# Patient Record
Sex: MALE | Race: WHITE | Employment: OTHER | URBAN - NONMETROPOLITAN AREA
[De-identification: names, ages, dates, MRNs, and addresses within clinical notes are randomized per-mention and may not be internally consistent; named-entity substitution may affect disease eponyms.]

---

## 2023-01-01 ENCOUNTER — CLINICAL DOCUMENTATION (OUTPATIENT)
Dept: NUTRITION | Age: 68
End: 2023-01-01

## 2023-10-20 ENCOUNTER — HOSPITAL ENCOUNTER (OUTPATIENT)
Dept: GENERAL RADIOLOGY | Age: 68
Discharge: HOME | End: 2023-10-20

## 2023-10-20 ENCOUNTER — HOSPITAL ENCOUNTER (OUTPATIENT)
Dept: CT IMAGING | Age: 68
Discharge: HOME | End: 2023-10-20
Attending: RADIOLOGY

## 2023-10-20 DIAGNOSIS — Z00.6 EXAMINATION FOR NORMAL COMPARISON FOR CLINICAL RESEARCH: ICD-10-CM

## 2023-10-23 ENCOUNTER — TELEPHONE (OUTPATIENT)
Dept: SURGERY | Age: 68
End: 2023-10-23

## 2023-10-23 NOTE — TELEPHONE ENCOUNTER
Left a message for Jessica Villavicencio, [facility] office regarding referral.  Per Dr. Gosia Cartagena patient needs referred to pulmonary for PFTs, EBUS, and biopsy.

## 2023-10-24 NOTE — TELEPHONE ENCOUNTER
Cindy Mcgregor, CNP office advised that they will get the patient referred to Pulmonary for evaluation.

## 2023-10-27 ENCOUNTER — OFFICE VISIT (OUTPATIENT)
Dept: PULMONOLOGY | Age: 68
End: 2023-10-27

## 2023-10-27 VITALS
WEIGHT: 159.4 LBS | DIASTOLIC BLOOD PRESSURE: 66 MMHG | TEMPERATURE: 97.8 F | HEART RATE: 79 BPM | HEIGHT: 71 IN | BODY MASS INDEX: 22.31 KG/M2 | OXYGEN SATURATION: 97 % | SYSTOLIC BLOOD PRESSURE: 118 MMHG

## 2023-10-27 DIAGNOSIS — J43.2 CENTRILOBULAR EMPHYSEMA (HCC): ICD-10-CM

## 2023-10-27 DIAGNOSIS — R91.8 MASS OF LINGULA OF LUNG: Primary | ICD-10-CM

## 2023-10-27 RX ORDER — HYDROCHLOROTHIAZIDE 25 MG/1
25 TABLET ORAL EVERY MORNING
COMMUNITY
Start: 2023-09-19

## 2023-10-27 RX ORDER — SODIUM CHLORIDE 9 MG/ML
INJECTION, SOLUTION INTRAVENOUS CONTINUOUS
Status: DISCONTINUED | OUTPATIENT
Start: 2023-10-27 | End: 2023-10-30 | Stop reason: HOSPADM

## 2023-10-27 RX ORDER — ATORVASTATIN CALCIUM 10 MG/1
5 TABLET, FILM COATED ORAL DAILY
COMMUNITY
Start: 2023-10-11

## 2023-10-27 RX ORDER — ASPIRIN 325 MG
325 TABLET ORAL DAILY
COMMUNITY

## 2023-10-27 RX ORDER — UMECLIDINIUM BROMIDE AND VILANTEROL TRIFENATATE 62.5; 25 UG/1; UG/1
1 POWDER RESPIRATORY (INHALATION) DAILY
Qty: 60 EACH | Refills: 2 | Status: SHIPPED | OUTPATIENT
Start: 2023-10-27

## 2023-10-27 RX ORDER — ALBUTEROL SULFATE 90 UG/1
2 AEROSOL, METERED RESPIRATORY (INHALATION) 4 TIMES DAILY PRN
Qty: 18 G | Refills: 5 | Status: SHIPPED | OUTPATIENT
Start: 2023-10-27

## 2023-10-27 RX ORDER — METOPROLOL SUCCINATE 25 MG/1
25 TABLET, EXTENDED RELEASE ORAL DAILY
COMMUNITY
Start: 2023-09-19

## 2023-10-27 RX ORDER — LEVOCETIRIZINE DIHYDROCHLORIDE 5 MG/1
5 TABLET, FILM COATED ORAL DAILY
COMMUNITY
Start: 2023-10-13

## 2023-10-30 ENCOUNTER — APPOINTMENT (OUTPATIENT)
Dept: GENERAL RADIOLOGY | Age: 68
End: 2023-10-30
Attending: INTERNAL MEDICINE
Payer: MEDICARE

## 2023-10-30 ENCOUNTER — ANESTHESIA (OUTPATIENT)
Dept: ENDOSCOPY | Age: 68
End: 2023-10-30
Payer: MEDICARE

## 2023-10-30 ENCOUNTER — ANESTHESIA EVENT (OUTPATIENT)
Dept: ENDOSCOPY | Age: 68
End: 2023-10-30
Payer: MEDICARE

## 2023-10-30 ENCOUNTER — HOSPITAL ENCOUNTER (OUTPATIENT)
Age: 68
Setting detail: OUTPATIENT SURGERY
Discharge: HOME | End: 2023-10-30
Attending: INTERNAL MEDICINE | Admitting: INTERNAL MEDICINE
Payer: MEDICARE

## 2023-10-30 VITALS
DIASTOLIC BLOOD PRESSURE: 62 MMHG | WEIGHT: 150 LBS | SYSTOLIC BLOOD PRESSURE: 129 MMHG | OXYGEN SATURATION: 93 % | HEIGHT: 71 IN | HEART RATE: 85 BPM | RESPIRATION RATE: 16 BRPM | TEMPERATURE: 97.3 F | BODY MASS INDEX: 21 KG/M2

## 2023-10-30 PROCEDURE — 6360000002 HC RX W HCPCS

## 2023-10-30 PROCEDURE — 3609020000 HC BRONCHOSCOPY W/EBUS FNA: Performed by: INTERNAL MEDICINE

## 2023-10-30 PROCEDURE — 2580000003 HC RX 258: Performed by: INTERNAL MEDICINE

## 2023-10-30 PROCEDURE — 88172 CYTP DX EVAL FNA 1ST EA SITE: CPT

## 2023-10-30 PROCEDURE — 87205 SMEAR GRAM STAIN: CPT

## 2023-10-30 PROCEDURE — 88173 CYTOPATH EVAL FNA REPORT: CPT

## 2023-10-30 PROCEDURE — 2500000003 HC RX 250 WO HCPCS

## 2023-10-30 PROCEDURE — 31624 DX BRONCHOSCOPE/LAVAGE: CPT | Performed by: INTERNAL MEDICINE

## 2023-10-30 PROCEDURE — 7100000011 HC PHASE II RECOVERY - ADDTL 15 MIN: Performed by: INTERNAL MEDICINE

## 2023-10-30 PROCEDURE — 88305 TISSUE EXAM BY PATHOLOGIST: CPT

## 2023-10-30 PROCEDURE — 3700000001 HC ADD 15 MINUTES (ANESTHESIA): Performed by: INTERNAL MEDICINE

## 2023-10-30 PROCEDURE — 31628 BRONCHOSCOPY/LUNG BX EACH: CPT | Performed by: INTERNAL MEDICINE

## 2023-10-30 PROCEDURE — 88341 IMHCHEM/IMCYTCHM EA ADD ANTB: CPT

## 2023-10-30 PROCEDURE — 7100000010 HC PHASE II RECOVERY - FIRST 15 MIN: Performed by: INTERNAL MEDICINE

## 2023-10-30 PROCEDURE — 2500000003 HC RX 250 WO HCPCS: Performed by: NURSE ANESTHETIST, CERTIFIED REGISTERED

## 2023-10-30 PROCEDURE — 3609011100 HC BRONCHOSCOPY BRUSHINGS: Performed by: INTERNAL MEDICINE

## 2023-10-30 PROCEDURE — 7100000001 HC PACU RECOVERY - ADDTL 15 MIN: Performed by: INTERNAL MEDICINE

## 2023-10-30 PROCEDURE — 87070 CULTURE OTHR SPECIMN AEROBIC: CPT

## 2023-10-30 PROCEDURE — 2720000010 HC SURG SUPPLY STERILE: Performed by: INTERNAL MEDICINE

## 2023-10-30 PROCEDURE — 3609010800 HC BRONCHOSCOPY ALVEOLAR LAVAGE: Performed by: INTERNAL MEDICINE

## 2023-10-30 PROCEDURE — 88342 IMHCHEM/IMCYTCHM 1ST ANTB: CPT

## 2023-10-30 PROCEDURE — 3700000000 HC ANESTHESIA ATTENDED CARE: Performed by: INTERNAL MEDICINE

## 2023-10-30 PROCEDURE — 71046 X-RAY EXAM CHEST 2 VIEWS: CPT

## 2023-10-30 PROCEDURE — 89051 BODY FLUID CELL COUNT: CPT

## 2023-10-30 PROCEDURE — 88104 CYTOPATH FL NONGYN SMEARS: CPT

## 2023-10-30 PROCEDURE — 31652 BRONCH EBUS SAMPLNG 1/2 NODE: CPT | Performed by: INTERNAL MEDICINE

## 2023-10-30 PROCEDURE — 88177 CYTP FNA EVAL EA ADDL: CPT

## 2023-10-30 PROCEDURE — 7100000000 HC PACU RECOVERY - FIRST 15 MIN: Performed by: INTERNAL MEDICINE

## 2023-10-30 RX ORDER — DEXAMETHASONE SODIUM PHOSPHATE 4 MG/ML
INJECTION, SOLUTION INTRA-ARTICULAR; INTRALESIONAL; INTRAMUSCULAR; INTRAVENOUS; SOFT TISSUE PRN
Status: DISCONTINUED | OUTPATIENT
Start: 2023-10-30 | End: 2023-10-30 | Stop reason: SDUPTHER

## 2023-10-30 RX ORDER — LIDOCAINE HYDROCHLORIDE 20 MG/ML
INJECTION, SOLUTION EPIDURAL; INFILTRATION; INTRACAUDAL; PERINEURAL PRN
Status: DISCONTINUED | OUTPATIENT
Start: 2023-10-30 | End: 2023-10-30 | Stop reason: SDUPTHER

## 2023-10-30 RX ORDER — ONDANSETRON 2 MG/ML
INJECTION INTRAMUSCULAR; INTRAVENOUS PRN
Status: DISCONTINUED | OUTPATIENT
Start: 2023-10-30 | End: 2023-10-30 | Stop reason: SDUPTHER

## 2023-10-30 RX ORDER — ROCURONIUM BROMIDE 10 MG/ML
INJECTION, SOLUTION INTRAVENOUS PRN
Status: DISCONTINUED | OUTPATIENT
Start: 2023-10-30 | End: 2023-10-30 | Stop reason: SDUPTHER

## 2023-10-30 RX ORDER — FENTANYL CITRATE 50 UG/ML
INJECTION, SOLUTION INTRAMUSCULAR; INTRAVENOUS PRN
Status: DISCONTINUED | OUTPATIENT
Start: 2023-10-30 | End: 2023-10-30 | Stop reason: SDUPTHER

## 2023-10-30 RX ORDER — PROPOFOL 10 MG/ML
INJECTION, EMULSION INTRAVENOUS PRN
Status: DISCONTINUED | OUTPATIENT
Start: 2023-10-30 | End: 2023-10-30 | Stop reason: SDUPTHER

## 2023-10-30 RX ADMIN — ROCURONIUM BROMIDE 35 MG: 10 INJECTION INTRAVENOUS at 14:20

## 2023-10-30 RX ADMIN — PROPOFOL 130 MG: 10 INJECTION, EMULSION INTRAVENOUS at 14:20

## 2023-10-30 RX ADMIN — FENTANYL CITRATE 25 MCG: 50 INJECTION, SOLUTION INTRAMUSCULAR; INTRAVENOUS at 14:20

## 2023-10-30 RX ADMIN — PHENYLEPHRINE HYDROCHLORIDE 80 MCG: 10 INJECTION INTRAVENOUS at 15:01

## 2023-10-30 RX ADMIN — SODIUM CHLORIDE: 9 INJECTION, SOLUTION INTRAVENOUS at 13:23

## 2023-10-30 RX ADMIN — DEXAMETHASONE SODIUM PHOSPHATE 8 MG: 4 INJECTION, SOLUTION INTRAMUSCULAR; INTRAVENOUS at 14:23

## 2023-10-30 RX ADMIN — LIDOCAINE HYDROCHLORIDE 40 MG: 20 INJECTION, SOLUTION EPIDURAL; INFILTRATION; INTRACAUDAL; PERINEURAL at 14:20

## 2023-10-30 RX ADMIN — PHENYLEPHRINE HYDROCHLORIDE 80 MCG: 10 INJECTION INTRAVENOUS at 14:41

## 2023-10-30 RX ADMIN — ONDANSETRON 4 MG: 2 INJECTION INTRAMUSCULAR; INTRAVENOUS at 14:32

## 2023-10-30 RX ADMIN — ROCURONIUM BROMIDE 8 MG: 10 INJECTION INTRAVENOUS at 14:50

## 2023-10-30 RX ADMIN — SUGAMMADEX 200 MG: 100 INJECTION, SOLUTION INTRAVENOUS at 16:08

## 2023-10-30 RX ADMIN — PHENYLEPHRINE HYDROCHLORIDE 100 MCG: 10 INJECTION INTRAVENOUS at 14:52

## 2023-10-30 ASSESSMENT — PAIN SCALES - GENERAL
PAINLEVEL_OUTOF10: 0

## 2023-10-30 ASSESSMENT — PAIN - FUNCTIONAL ASSESSMENT: PAIN_FUNCTIONAL_ASSESSMENT: NONE - DENIES PAIN

## 2023-10-30 ASSESSMENT — ENCOUNTER SYMPTOMS: SHORTNESS OF BREATH: 1

## 2023-10-30 NOTE — PROGRESS NOTES
1618 Pt to PACU, alert and oriented. Resp easy and unlabored on room air. Nasal cannula applied upon arrival due to oxygenation. 1620 Increased oxygen to 6L. Pt alert and denies pain. Portable x ray called. VSS.    1628 Portable x ray at bedside, stated that the order for x ray is a 2 view and patient will need to go downstairs for x ray. [address] called to update on plan of care for patient. Pt resting in bed with eyes closed, resp easy and unlabored. VSS.     1645  Pt continues to rest in bed, alert and oriented. Resp easy and unlabored on 2 L NC.    1648 Pt meets criteria for discharge from PACU at this time. Radiology called to make them aware of pt coming to x ray. [facility] called and report given to [facility]. Pt to x ray in stable condition.

## 2023-10-30 NOTE — PROGRESS NOTES
Patient is in phase 2    taking fluids.  discussed findings, plan of care, discharge instructions with .

## 2023-10-30 NOTE — PROGRESS NOTES
EBUS completed, tolerated well. Photos taken. FNA to 4R lymph node X 5, 4L lymph node x4. 2 specimen jars and slides taken to lab per pathologist. Bronchoscopy done- BAL obtained. 1 biopsy obtained. 1 cytology brushing completed. 3 specimen jar labeled and sent to lab. Scope  and  used.

## 2023-10-30 NOTE — PROCEDURES
Bronchoscopy Procedure Note    Date of Operation: 10/30/2023    Pre-op Diagnosis: ROBERTO lung mass with mediastinal LAD     Post-op Diagnosis: same    Surgeon: Lisa Novak DO    Anesthesia: General endotracheal anesthesia    Operation: Flexible fiberoptic bronchoscopy, EBUS, BAL     Estimated Blood Loss: less than 50     Complications: none    Indications and History:  The patient is a 77 y.o. male with ROBERTO lung mass with mediastinal LAD . The risks, benefits, complications, treatment options and expected outcomes were discussed with the patient. The possibilities of reaction to medication, pulmonary aspiration, perforation of a viscus, bleeding, failure to diagnose a condition and creating a complication requiring transfusion or operation were discussed with the patient who freely signed the consent. Description of Procedure: The patient was taken to endoscopy suite, identified as Brigida White and the procedure verified as Flexible Fiberoptic Bronchoscopy. A Time Out was held and the above information confirmed. After induction and oralpharyngeal intubation, the patient was placed in appropriate position and the bronchoscope was passed through the ETT. The scope was then passed into the trachea. Lidocaine 2% 3 ml was used topically on the dilia. Careful inspection of the tracheal lumen was accomplished. The scope was sequentially passed into the left main and then left upper and lower bronchi and segmental bronchi. The scope was then withdrawn and advanced into the right main bronchus and then into the RUL, RML, and RLL bronchi and segmental bronchi. The bronchoscope was withdrawn and exchanged for EBUS scope. The EBUS was advanced to station 4R and the lymph node was visualized with real-time ultrasound. Five passes were performed. The site was then changed to 4L and 4 passes were performed.  Once adequate specimens were obtained the airways were again checked to ensure adequate hemostasis and the EBUS scope was withdrawn. Following this the bronchoscope was passed to the ROBERTO anterior segment and transbronchial biopsies were performed x5 under fluoroscopy guidance. Afterwards a cold saline BAL was performed. Once hemostasis was achieved the scope was then withdrawn and procedure was concluded. Endobronchial findings:   Trachea: Normal mucosa  Dacia: Normal mucosa  Right main bronchus: Normal mucosa  Right upper lobe bronchus: Normal mucosa  Right Middle lobe bronchus: Normal mucosa  Right Lower lobe bronchus: Normal mucosa  Left main bronchus: Normal mucosa  Left upper lobe bronchus: Endobronchial lesion partially occluding lumen of ROBERTO anterior segment  Left lower lobe bronchus: Normal mucosa    The Patient was taken to the Endoscopy Recovery area in satisfactory condition. A CXR was ordered to ensure no pneumothorax. Recommendation:  1. F/U on culture results  2.  F/U on cytology results      Luz Rankin DO

## 2023-10-30 NOTE — PROGRESS NOTES
[facility]    Patient: Ge Lipoma, 77 y.o.   : 1955  10/27/2023    Patient of KILLIAN Cody CNP   Referring Provider: No ref. provider found       Subjective     No chief complaint on file. KAVON Wang with presents to Pulmonology clinic today to discuss abnormal CT chest demonstrating a left upper lobe lung mass with enlarged mediastinal lymphadenopathy. He is accompanied by his wife today. Patient endorses 30 pound unintentional weight loss since August as well as chills, occasional drenching night sweats and feeling exhausted. He also endorses shortness of breath with exertion which is gotten worse. Endorses occasional mucus production but usually the cough is nonproductive. He is a prior pack-a-day smoker but quit in .    10/30/23: Presents today for scheduled bronchoscopy. Immunizations:  Immunization History   Administered Date(s) Administered    COVID-19, PFIZER Bivalent, DO NOT Dilute, (age 12y+), IM, 30 mcg/0.3 mL 10/06/2022    COVID-19, PFIZER GRAY top, DO NOT Dilute, (age 15 y+), IM, 30 mcg/0.3 mL 2022    COVID-19, PFIZER PURPLE top, DILUTE for use, (age 15 y+), 30mcg/0.3mL 2021, 2021, 2021      Past Medical hx   PMH:No past medical history on file. SURGICAL HISTORY:No past surgical history on file. SOCIAL HISTORY:  Social History     Tobacco Use    Smoking status: Former     Packs/day: 1     Types: Cigarettes     Quit date:      Years since quittin.8    Smokeless tobacco: Never    Tobacco comments:     Quit smoking    Vaping Use    Vaping Use: Never used   Substance Use Topics    Alcohol use: Yes     Comment: occasionally    Drug use: Never     ALLERGIES:No Known Allergies  FAMILY HISTORY:No family history on file.   CURRENT MEDICATIONS:  Current Facility-Administered Medications   Medication Dose Route Frequency Provider Last Rate Last Admin    0.9 % sodium chloride infusion

## 2023-10-31 LAB
BAL CHARACTER: ABNORMAL
BAL COLLECTION SITE: ABNORMAL
BAL COLOR: ABNORMAL
CILIATED/EPITHELIAL CELLS BAL: 14 % (ref 0–5)
EOSINOPHIL NFR BRONCH MANUAL: 1 % (ref 0–1)
LYMPHOCYTES NFR BRONCH MANUAL: 37 % (ref 10–15)
MACROPHAGE/MONOCYTE BAL: 34 % (ref 86–100)
NEUTROPHILS NFR BRONCH MANUAL: 14 % (ref 0–3)
PATHOLOGIST REVIEW: ABNORMAL
RBC BAL: 6210 /CUMM
TOTAL NUCLEATED CELLS BAL: 115 /CUMM
TOTAL VOLUME RECEIVED BAL: 40 ML

## 2023-10-31 PROCEDURE — 88341 IMHCHEM/IMCYTCHM EA ADD ANTB: CPT

## 2023-10-31 RX ORDER — SODIUM CHLORIDE 0.9 % (FLUSH) 0.9 %
5-40 SYRINGE (ML) INJECTION EVERY 12 HOURS SCHEDULED
Status: ACTIVE | OUTPATIENT
Start: 2023-10-31

## 2023-10-31 RX ORDER — SODIUM CHLORIDE 9 MG/ML
INJECTION, SOLUTION INTRAVENOUS PRN
Status: ACTIVE | OUTPATIENT
Start: 2023-10-31

## 2023-10-31 RX ORDER — SODIUM CHLORIDE 0.9 % (FLUSH) 0.9 %
5-40 SYRINGE (ML) INJECTION PRN
Status: ACTIVE | OUTPATIENT
Start: 2023-10-31

## 2023-10-31 NOTE — ANESTHESIA POSTPROCEDURE EVALUATION
Department of Anesthesiology  Postprocedure Note    Patient: Michelle Diamond  MRN: [id number]  YOB: 1955  Date of evaluation: 10/31/2023      Procedure Summary     Date: 10/30/23 Room / Location: [address] [place] [address] / [address] [place] [address]    Anesthesia Start: 2163 Anesthesia Stop: 8236    Procedures:       BRONCHOSCOPY W/EBUS FNA      BRONCHOSCOPY ALVEOLAR LAVAGE      BRONCHOSCOPY BRUSHINGS Diagnosis:       Lung mass      (Lung mass [R91.8])    Surgeons: Kaila Laughlin DO Responsible Provider: Danielle Bryan DO    Anesthesia Type: general ASA Status: 3          Anesthesia Type: No value filed.     Isha Phase I: Isha Score: 9    Isha Phase II: Isha Score: 10      Anesthesia Post Evaluation    Patient location during evaluation: PACU  Patient participation: complete - patient participated  Level of consciousness: awake  Airway patency: patent  Nausea & Vomiting: no nausea  Complications: no  Cardiovascular status: hemodynamically stable  Respiratory status: acceptable  Hydration status: stable  Pain management: adequate

## 2023-11-01 ENCOUNTER — HOSPITAL ENCOUNTER (OUTPATIENT)
Dept: PET IMAGING | Age: 68
Discharge: HOME | End: 2023-11-01
Attending: INTERNAL MEDICINE
Payer: MEDICARE

## 2023-11-01 ENCOUNTER — TELEPHONE (OUTPATIENT)
Dept: PULMONOLOGY | Age: 68
End: 2023-11-01

## 2023-11-01 DIAGNOSIS — R91.8 MASS OF LINGULA OF LUNG: ICD-10-CM

## 2023-11-01 DIAGNOSIS — C79.51 MALIGNANT NEOPLASM METASTATIC TO BONE (HCC): Primary | ICD-10-CM

## 2023-11-01 LAB
BACTERIA SPEC RESP CULT: NORMAL
GRAM STN SPEC: NORMAL

## 2023-11-01 PROCEDURE — 3430000000 HC RX DIAGNOSTIC RADIOPHARMACEUTICAL: Performed by: INTERNAL MEDICINE

## 2023-11-01 PROCEDURE — 78815 PET IMAGE W/CT SKULL-THIGH: CPT

## 2023-11-01 PROCEDURE — A9552 F18 FDG: HCPCS | Performed by: INTERNAL MEDICINE

## 2023-11-01 RX ORDER — FLUDEOXYGLUCOSE F 18 200 MCI/ML
13.1 INJECTION, SOLUTION INTRAVENOUS
Status: COMPLETED | OUTPATIENT
Start: 2023-11-01 | End: 2023-11-01

## 2023-11-01 RX ADMIN — FLUDEOXYGLUCOSE F 18 13.1 MILLICURIE: 200 INJECTION, SOLUTION INTRAVENOUS at 13:18

## 2023-11-01 NOTE — TELEPHONE ENCOUNTER
Spoke with  Chana Raulito this evening to update him on the results from the PET CT which demonstrated PET avidity concerning for metastatic cancer (uptake in ROBERTO lung mass, mediastinum, L 4th rib, and head of pancreas). Cytology results are pending. Will place order to [address] [place] [address] in lieu of cytology results. This was communicated to  Chana Salmeronby and he is amendable to this.      Ortega Fulton, DO   Pulmonary & Critical Care

## 2023-11-03 PROBLEM — C34.92 MALIGNANT NEOPLASM OF LEFT LUNG (HCC): Status: ACTIVE | Noted: 2023-11-03

## 2023-11-07 ENCOUNTER — OFFICE VISIT (OUTPATIENT)
Dept: PULMONOLOGY | Age: 68
End: 2023-11-07
Payer: MEDICARE

## 2023-11-07 VITALS
SYSTOLIC BLOOD PRESSURE: 138 MMHG | HEIGHT: 71 IN | DIASTOLIC BLOOD PRESSURE: 68 MMHG | WEIGHT: 155.8 LBS | BODY MASS INDEX: 21.81 KG/M2 | TEMPERATURE: 97.7 F | HEART RATE: 91 BPM | OXYGEN SATURATION: 94 %

## 2023-11-07 DIAGNOSIS — J43.8 OTHER EMPHYSEMA (HCC): ICD-10-CM

## 2023-11-07 DIAGNOSIS — R63.4 WEIGHT LOSS, UNINTENTIONAL: ICD-10-CM

## 2023-11-07 DIAGNOSIS — C79.51 MALIGNANT NEOPLASM METASTATIC TO BONE (HCC): ICD-10-CM

## 2023-11-07 DIAGNOSIS — C34.12 ADENOCARCINOMA OF UPPER LOBE OF LEFT LUNG (HCC): Primary | ICD-10-CM

## 2023-11-07 DIAGNOSIS — R53.83 OTHER FATIGUE: ICD-10-CM

## 2023-11-07 PROCEDURE — 1036F TOBACCO NON-USER: CPT | Performed by: NURSE PRACTITIONER

## 2023-11-07 PROCEDURE — 3017F COLORECTAL CA SCREEN DOC REV: CPT | Performed by: NURSE PRACTITIONER

## 2023-11-07 PROCEDURE — G8484 FLU IMMUNIZE NO ADMIN: HCPCS | Performed by: NURSE PRACTITIONER

## 2023-11-07 PROCEDURE — 1123F ACP DISCUSS/DSCN MKR DOCD: CPT | Performed by: NURSE PRACTITIONER

## 2023-11-07 PROCEDURE — G8420 CALC BMI NORM PARAMETERS: HCPCS | Performed by: NURSE PRACTITIONER

## 2023-11-07 PROCEDURE — G8427 DOCREV CUR MEDS BY ELIG CLIN: HCPCS | Performed by: NURSE PRACTITIONER

## 2023-11-07 PROCEDURE — 3023F SPIROM DOC REV: CPT | Performed by: NURSE PRACTITIONER

## 2023-11-07 PROCEDURE — 99213 OFFICE O/P EST LOW 20 MIN: CPT | Performed by: NURSE PRACTITIONER

## 2023-11-07 ASSESSMENT — ENCOUNTER SYMPTOMS
VOMITING: 0
ALLERGIC/IMMUNOLOGIC NEGATIVE: 1
CHEST TIGHTNESS: 0
SHORTNESS OF BREATH: 1
COUGH: 1
GASTROINTESTINAL NEGATIVE: 1
EYES NEGATIVE: 1
WHEEZING: 0
DIARRHEA: 0
NAUSEA: 0
STRIDOR: 0

## 2023-11-08 ENCOUNTER — HOSPITAL ENCOUNTER (OUTPATIENT)
Dept: RADIATION ONCOLOGY | Age: 68
Discharge: HOME | End: 2023-11-08
Payer: MEDICARE

## 2023-11-08 VITALS
BODY MASS INDEX: 21.7 KG/M2 | DIASTOLIC BLOOD PRESSURE: 63 MMHG | HEIGHT: 71 IN | HEART RATE: 84 BPM | WEIGHT: 155 LBS | SYSTOLIC BLOOD PRESSURE: 138 MMHG | OXYGEN SATURATION: 96 % | RESPIRATION RATE: 18 BRPM | TEMPERATURE: 98.2 F

## 2023-11-08 DIAGNOSIS — C34.12 MALIGNANT NEOPLASM OF UPPER LOBE OF LEFT LUNG (HCC): ICD-10-CM

## 2023-11-08 PROCEDURE — 99205 OFFICE O/P NEW HI 60 MIN: CPT

## 2023-11-08 PROCEDURE — 99202 OFFICE O/P NEW SF 15 MIN: CPT | Performed by: RADIOLOGY

## 2023-11-08 ASSESSMENT — ENCOUNTER SYMPTOMS
VOMITING: 0
CHEST TIGHTNESS: 0
BACK PAIN: 0
SORE THROAT: 0
ABDOMINAL PAIN: 0
NAUSEA: 0
BLOOD IN STOOL: 0
COUGH: 1
APNEA: 0
WHEEZING: 0
SHORTNESS OF BREATH: 1
TROUBLE SWALLOWING: 0

## 2023-11-08 NOTE — PROGRESS NOTES
facility-administered medications for this encounter. Facility-Administered Medications Ordered in Other Encounters   Medication Dose Route Frequency Provider Last Rate Last Admin    sodium chloride flush 0.9 % injection 5-40 mL  5-40 mL IntraVENous 2 times per day Wava Shelling P, DO        sodium chloride flush 0.9 % injection 5-40 mL  5-40 mL IntraVENous PRN Wava Shelling P, DO        0.9 % sodium chloride infusion   IntraVENous PRN Sonu Lords, DO           No outpatient medications have been marked as taking for the 11/8/23 encounter [facility] Encounter) with Nikolai Garcia PA-C.       LABORATORY STUDIES:   Onc labs: No results found for: \"PSA\", \"CEA\", \"LDH\", \"AFP\"    No results found for: \"CREATININE\"  No results found for: \"BUN\"    PATHOLOGY: As per HPI above. RADIOLOGIC STUDIES: As per HPI above. ATTESTATION: 60 minutes (09916) minutes were spent with the patient at today's visit. reviewing pertinent information related to their oncologic diagnosis, including any recent labs, imaging, follow ups and plan of care going forward. >50% of time spent in counseling and coordinating care.     CC:Dr. Kati Angelo ([address] [place] [address]) Dr. Scottie Manzano (Pulmonology)   ACC:. [facility]

## 2023-11-08 NOTE — PROGRESS NOTES
P, DO           Past Medical History:  Past Medical History:   Diagnosis Date    Hypertension     Lung cancer ([address]) 10/30/2023       Past Surgical History:  Past Surgical History:   Procedure Laterality Date    BRONCHOSCOPY N/A 10/30/2023    BRONCHOSCOPY W/EBUS FNA performed by Peyton Michel DO at [facility]  10/30/2023    BRONCHOSCOPY ALVEOLAR LAVAGE performed by Peyton Michel DO at [facility]  10/30/2023    BRONCHOSCOPY BRUSHINGS performed by Peyton Michel DO at [address] [facility] [address]  10/30/2023    [facility] ENDOBRONCHIAL ULTRASOUND EBUS  10/30/2023      Fam HX:   Family History   Problem Relation Age of Onset    Stroke Mother     Heart Disease Father     Cancer Brother       Social HX:   Social History     Socioeconomic History    Marital status:      Spouse name: Octavio Christianson    Number of children: Not on file    Years of education: Not on file    Highest education level: Not on file   Occupational History    Not on file   Tobacco Use    Smoking status: Former     Packs/day: 1.00     Years: 32.00     Additional pack years: 0.00     Total pack years: 32.00     Types: Cigarettes     Start date: 0     Quit date: 2004     Years since quittin.8    Smokeless tobacco: Never    Tobacco comments:     Quit smoking 2004   Vaping Use    Vaping Use: Never used   Substance and Sexual Activity    Alcohol use: Yes     Comment: occasionally    Drug use: Never    Sexual activity: Not on file   Other Topics Concern    Not on file   Social History Narrative    Not on file     Social Determinants of Health     Financial Resource Strain: Not on file   Food Insecurity: Not on file   Transportation Needs: Not on file   Physical Activity: Not on file   Stress: Not on file   Social Connections: Not on file   Intimate Partner Violence: Not on file   Housing Stability: Not on file          EXAM:   height is 1.803 m (5' 11\") and weight is 69.4 kg (153 lb).  His oral

## 2023-11-08 NOTE — PROGRESS NOTES
Nutrition Assessment    Reason for Visit:   11/8/23 - new consult for lung cancer and significant weight loss    Nutrition Recommendations:   PO at best efforts  Small and frequent meals and snacks  Trial ONS  High calorie, high protein foods  High calorie, high protein smoothies    Malnutrition Assessment: (11/8/23)  Malnutrition Status: severe malnutrition  Context: acute illness  Findings of the 6 clinical characteristics of malnutrition (minimum of 2 out of 6 clinical characteristics is required to make the dx of moderate or severe Protein Calorie Malnutrition based on AND/ASPEN Guidelines):   1. Energy Intake: <50% of normal intake   2. Weight Loss: 7% over the last month   3. Fat Loss: severe loss   4. Muscle Loss: severe loss   5. Fluid Accumulation: none noted   6.  Strength: not measured    Nutrition Diagnosis:   Problem: severe malnutrition in the context of acute illness  Etiology: catabolic illness, inadequate oral intake  Signs and Symptoms: decreased energy intake, significant unintentional weight loss, severe fat and muscle loss    Nutrition Assessment:   History: lung cancer  Subjective: Patient seen with wife. Wife is very concerned with patient weight loss. Patient admits to being hungry but is just not able to eat much. Wife feels that patient is eating less the 50% of his normal. Patient says that he just fills up quickly or food just doesn't sound good. Patient does report some issues with constipation and uses Milk of Mag to aid with bowel movement. Patient has not tried ONS but is willing. Patient says that he is drinking ~2 bottles of water per day (~32oz).  Current Nutrition:   Oral Diet: general   Oral Diet Intake:  poor    Oral Nutrition Supplement (ONS): ensure clear, boost plus, ensure complete samples provided   ONS intake:    trial  Anthropometric Measures:   Ht:   5'11:   Current Weight: 149# (per patient on 11/8/23)   Usual Weight:   160# (per patient one month ago)   Ideal

## 2023-11-09 ENCOUNTER — SOCIAL WORK (OUTPATIENT)
Dept: RADIATION ONCOLOGY | Age: 68
End: 2023-11-09

## 2023-11-09 NOTE — PROGRESS NOTES
Oncology Social Work    Date: 11/9/2023  Time: 4:17 PM  Name: Bryson Mccormick  MRN: [id number]     Contact Type: Follow-up    Note:   Situation: This staff called Bryson Mccormick via phone support to introduce myself as his Oncology Social Worker. Background:  Roma Kendall had completed a Distress Thermometer at his consultation appointment in the Radiation Dept of the [address] [place] [address]. This staff was calling to review the results. Assessment: Droian's Distress Thermometer reflected a couple concerns which were reviewed during our conversation.   - He explained that he was just beginning his treatment process. He didn't have any questions at this time but was thankful for the call. - Education regarding the services provided by the SW were also discussed. I explained how to locate me in the facility and offered further assistance should something come up regarding her concerns. - Since one of his concerns was fatigue and his changes in eating, it was suggested that he utilize our dietician. He shared his plan to investigate the suggestions and if anything else comes up, he knows how to find me now. No additional referrals were made at this time. Recommendation: Follow-up will be initiated by Roma Kendall based on need.  provided him with my contact information and will remain available for support.         Karyle Hampshire, MSW, LSW, SELMA  Oncology Social Worker      Electronically signed by Karyle Hampshire, MSW, LSW, ACHP-SW on 11/9/2023 at 4:17 PM

## 2023-11-10 ENCOUNTER — OFFICE VISIT (OUTPATIENT)
Dept: ONCOLOGY | Age: 68
End: 2023-11-10
Payer: MEDICARE

## 2023-11-10 ENCOUNTER — CLINICAL DOCUMENTATION (OUTPATIENT)
Dept: CASE MANAGEMENT | Age: 68
End: 2023-11-10

## 2023-11-10 ENCOUNTER — HOSPITAL ENCOUNTER (OUTPATIENT)
Dept: INFUSION THERAPY | Age: 68
Discharge: HOME | End: 2023-11-10
Payer: MEDICARE

## 2023-11-10 VITALS
TEMPERATURE: 97.6 F | BODY MASS INDEX: 21.42 KG/M2 | SYSTOLIC BLOOD PRESSURE: 136 MMHG | DIASTOLIC BLOOD PRESSURE: 62 MMHG | RESPIRATION RATE: 20 BRPM | OXYGEN SATURATION: 95 % | HEIGHT: 71 IN | HEART RATE: 99 BPM | WEIGHT: 153 LBS

## 2023-11-10 VITALS
RESPIRATION RATE: 20 BRPM | HEIGHT: 71 IN | DIASTOLIC BLOOD PRESSURE: 62 MMHG | OXYGEN SATURATION: 95 % | WEIGHT: 153 LBS | BODY MASS INDEX: 21.42 KG/M2 | TEMPERATURE: 97.6 F | HEART RATE: 99 BPM | SYSTOLIC BLOOD PRESSURE: 136 MMHG

## 2023-11-10 DIAGNOSIS — C34.12 MALIGNANT NEOPLASM OF UPPER LOBE OF LEFT LUNG (HCC): Primary | ICD-10-CM

## 2023-11-10 PROCEDURE — G8484 FLU IMMUNIZE NO ADMIN: HCPCS | Performed by: INTERNAL MEDICINE

## 2023-11-10 PROCEDURE — 3017F COLORECTAL CA SCREEN DOC REV: CPT | Performed by: INTERNAL MEDICINE

## 2023-11-10 PROCEDURE — 1036F TOBACCO NON-USER: CPT | Performed by: INTERNAL MEDICINE

## 2023-11-10 PROCEDURE — 1123F ACP DISCUSS/DSCN MKR DOCD: CPT | Performed by: INTERNAL MEDICINE

## 2023-11-10 PROCEDURE — G8420 CALC BMI NORM PARAMETERS: HCPCS | Performed by: INTERNAL MEDICINE

## 2023-11-10 PROCEDURE — 99211 OFF/OP EST MAY X REQ PHY/QHP: CPT

## 2023-11-10 PROCEDURE — 99205 OFFICE O/P NEW HI 60 MIN: CPT | Performed by: INTERNAL MEDICINE

## 2023-11-10 PROCEDURE — G8427 DOCREV CUR MEDS BY ELIG CLIN: HCPCS | Performed by: INTERNAL MEDICINE

## 2023-11-10 NOTE — PROGRESS NOTES
Infectious Disease Clinic Visit    Reason:    Hosp f/u    HPI:    56-year-old man with CAD, HTN, DM2, diabetic foot infections, osteomyelitis of the left foot s/p left hallux amputation and partial left 2nd toe amputation (2020) and right 5th ray amputation (06/09/22), and recently admitted for Right third toe infection/osteomyelitis, with plantar diabetic ulcer wound at 2nd met head which probes to bone per podiatry consult inpatient, thus clinically concerning for possible osteo (MRI did not show OM involving affected 2nd met head).     Outpatient podiatry bone culture of right third toe 11/15 grew MSSA and Pasturella. Podiatry took patient to the OR on (11/25) for right third toe amputation. Surgical cx+ Diptheroids, Gram stain with GPCs. Right 3rd toe amputation performed with 2-sections of amputated toe sent for pathology; remains uncertain if biopsy of bone for clean margin collected / sent.     Pt continued outpt abx tx w/ IV-Ceftriaxone 2 g q 24 hours for pasturella & MSSA (11/15), and Diphtheroids (11/25; GPCs on gram stain) -- to complete 6-weeks (s/p 11/25, surg date of right 3rd toe amputation) -- favored 6wks due to inability to confirm clean  margins, and due to clinical concerns for possible OM involving retained / non-excised 2nd MTPJ given associated plantar wound probe to bone.    --TODAY, in ID clinic patient reports doing well with IV abx, completed last dose of IV-ceftriaxone this AM. Pt following w/ POD, who reports pt progressing well w/o issues.  Pt w/o complaint or issues. Plantar wound healing well, no redness, swelling, pain, warmth, or drainage. Denies fever, chills, night sweats, rash, joint pain/swelling or wounds elsewhere. Denies diarrhea, N/V, constipation, SOB, cough, chest pain, abd pain, difficulty urinating, or headache.    Last labs: 01/04/23: remains w/ normal CRP and no leukocytosis. Cr (0.9, baseline, unchanged).    Review of Systems   Constitutional:  Negative for chills,  Name: Monik Gustafson  : 1955  MRN: [id number]    Oncology Navigation- Initial Note:    Intake-  Contact Type: Medical Oncology  Spouse Teddy Fernandes accompanied consultation     Diagnosis: Thoracic- malignant    Home Disposition: Lives with other who is able to assist  - spouse Teddy Fernandes available to assist if need  -independent/perform ADL/drives  - 3 sons ( combined)  live OOT  - SOB/PARRA , occ cough,increase fatigue,30 ib weight loss since August  -met with Lino lou ( during rad consult)    ONCPOC:  - discussed stage IV disease/ however have mixed diagnosis/ to discuss with pathology  -foundation testing on tissue  -waiting MRI results   - discuss xgeva/zometa-- instructed to get dental clearance  -return Md apt     Radiation informed unable to do at this time on lungs due to extensive disease.          Patient needs and barriers to care: Coordination of Care, Knowledge deficit, and Symptom Management     Referral Source: Outpatient    Receptive to Advanced Care Planning/ Palliative Care:  deferred    Interventions-   General Interventions: Jose program explained;  welcome folder reviewed, including contact information      Education/Screenings:  yes - reiterated ONC POC        Biopsy site status: MD discuss with pathology/ diagnosis not conclusive       Continuum of Care: Diagnosis/Active Treatment    Notes: Jose following to assist & support     Electronically signed by Toro Stephens RN on 11/10/2023 at 11:50 AM diaphoresis, fever and weight loss.   HENT:  Negative for congestion, sinus pain and sore throat.    Eyes:  Negative for pain and discharge.   Respiratory:  Negative for cough, sputum production and shortness of breath.    Cardiovascular:  Negative for chest pain and leg swelling.   Gastrointestinal:  Negative for abdominal pain, diarrhea, nausea and vomiting.   Genitourinary:  Negative for dysuria and hematuria.   Musculoskeletal:  Negative for joint pain.   Skin:  Negative for rash.        Plantar wound at right foot met pad, healing well, no drainage, redness, or swelling.    Neurological:  Negative for focal weakness and headaches.   Endo/Heme/Allergies:  Negative for environmental allergies.   Psychiatric/Behavioral:  Negative for substance abuse. The patient is not nervous/anxious.        Physical Exam  Vitals reviewed.   Constitutional:       General: He is not in acute distress.     Appearance: Normal appearance. He is not ill-appearing, toxic-appearing or diaphoretic.   HENT:      Head: Normocephalic and atraumatic.      Mouth/Throat:      Mouth: Mucous membranes are moist.      Pharynx: Oropharynx is clear.   Eyes:      General: No scleral icterus.     Conjunctiva/sclera: Conjunctivae normal.   Cardiovascular:      Rate and Rhythm: Normal rate and regular rhythm.      Pulses: Normal pulses.      Heart sounds: Normal heart sounds.   Pulmonary:      Effort: Pulmonary effort is normal.      Breath sounds: Normal breath sounds.   Abdominal:      General: Abdomen is flat. Bowel sounds are normal.      Palpations: Abdomen is soft.      Tenderness: There is no abdominal tenderness.   Musculoskeletal:         General: No swelling or tenderness. Normal range of motion.      Cervical back: Normal range of motion. No tenderness.      Right lower leg: No edema.      Left lower leg: No edema.   Lymphadenopathy:      Cervical: No cervical adenopathy.   Skin:     General: Skin is warm and dry.      Coloration: Skin is  not jaundiced.      Findings: No erythema or rash.      Comments: Plantar wound at right foot met pad, healing well, no drainage, redness, or swelling.    Neurological:      Mental Status: He is alert and oriented to person, place, and time. Mental status is at baseline.   Psychiatric:         Behavior: Behavior normal.         Thought Content: Thought content normal.         Review of patient's allergies indicates:   Allergen Reactions    Penicillins Other (See Comments)     PCN allergy as a child - was told he went into a coma. Tolerates Cefazolin without adverse reactions    Shellfish containing products      Other reaction(s): Unknown    Vancomycin Itching     Tolerated vancomycin 7/2020    Bactrim  [sulfamethoxazole-trimethoprim] Rash         Current Outpatient Medications:     acetaminophen (TYLENOL) 500 MG tablet, Take 1,000 mg by mouth daily as needed for Pain., Disp: , Rfl:     ascorbic acid, vitamin C, (VITAMIN C) 250 MG tablet, Take 500 mg by mouth once daily., Disp: , Rfl:     aspirin (ECOTRIN) 81 MG EC tablet, Take 1 tablet (81 mg total) by mouth once daily., Disp: , Rfl: 0    atorvastatin (LIPITOR) 80 MG tablet, Take 1 tablet (80 mg total) by mouth once daily. Take every night., Disp: 90 tablet, Rfl: 3    bismuth subsalicylate (PEPTO BISMOL) 262 mg/15 mL suspension, Take 15 mLs by mouth daily as needed (diarrhea)., Disp: , Rfl:     blood sugar diagnostic (ACCU-CHEK GUIDE TEST STRIPS) Strp, 1 each by Misc.(Non-Drug; Combo Route) route 5 (five) times daily., Disp: 150 each, Rfl: 11    carvediloL (COREG) 12.5 MG tablet, Take 0.5 tablets (6.25 mg total) by mouth 2 (two) times daily with meals., Disp: 180 tablet, Rfl: 3    cefTRIAXone (ROCEPHIN) 2 g/50 mL PgBk IVPB, Inject 50 mLs (2 g total) into the vein Daily., Disp: , Rfl: 0    dapagliflozin (FARXIGA) 5 mg Tab tablet, Take 1 tablet (5 mg total) by mouth once daily., Disp: 30 tablet, Rfl: 4    insulin (LANTUS SOLOSTAR U-100 INSULIN) glargine 100 units/mL  "SubQ pen, Inject 50 Units into the skin once daily. USE AS BACK UP INSULIN ONLY - EMERGENCY USE IF YOUR ARE OFF OF YOUR INSULIN PUMP, Disp: 15 mL, Rfl: 1    insulin aspart U-100 (NOVOLOG U-100 INSULIN ASPART) 100 unit/mL injection, To use continuously with insulin pump. Max TDD of 100 units, Disp: 30 mL, Rfl: 3    insulin lispro (HUMALOG U-100 INSULIN) 100 unit/mL injection, Inject 15 Units into the skin 3 (three) times daily before meals. Gives via insulin pump only. Do not Directly inject., Disp: , Rfl:     lancets (ACCU-CHEK FASTCLIX LANCET DRUM) Misc, 1 each by Misc.(Non-Drug; Combo Route) route Daily., Disp: 30 each, Rfl: 11    lisinopriL (PRINIVIL,ZESTRIL) 40 MG tablet, Take 1 tablet by mouth once daily, Disp: 30 tablet, Rfl: 0    metFORMIN (GLUCOPHAGE) 1000 MG tablet, Take 1 tablet (1,000 mg total) by mouth 2 (two) times daily with meals., Disp: 180 tablet, Rfl: 3    multivit-min/folic/vit K/lycop (MEN'S MULTIVITAMIN ORAL), Take 1 tablet by mouth once daily., Disp: , Rfl:     pen needle, diabetic 31 gauge x 3/16" Ndle, Inject 1 each into the skin 4 (four) times daily., Disp: , Rfl:     MINIMED 770G INSULIN PUMP Misc, 1 each by Misc.(Non-Drug; Combo Route) route continuous. Medically necessary for management of Type 2 diabetes, E11.65, Disp: 1 each, Rfl: 0    Past Medical History:   Diagnosis Date    Allergy     CAD (coronary artery disease), native coronary artery 6/25/2013    Cancer     COVID-19 virus detected 9/1/2020    Diabetes mellitus     Diabetes mellitus, type 2     Disorder of kidney and ureter     Heart attack 04/2012    Hx of colon cancer, stage I     Hyperlipidemia     Hypertension     Muscular pain     post-op after colonoscopy    NSTEMI May 2013 - peak troponin 0.22 5/22/2013    MICHAELA (obstructive sleep apnea)     Retinopathy due to secondary diabetes        Lab Results   Component Value Date    WBC 7.83 01/03/2023    CRP 4.5 01/03/2023    SEDRATE 56 (H) 11/22/2022    CREATININE 0.9 01/03/2023    " "AST 22 01/03/2023    ALT 30 01/03/2023    ALKPHOS 107 01/03/2023       Immunization History   Administered Date(s) Administered    COVID-19, MRNA, LN-S, PF (MODERNA FULL 0.5 ML DOSE) 03/30/2021, 04/28/2021    Influenza 01/18/2019    Influenza - Intradermal - Quadrivalent - PF 10/24/2013    Influenza - Quadrivalent - PF *Preferred* (6 months and older) 11/08/2010, 09/28/2020, 12/07/2022    Pneumococcal Conjugate - 13 Valent 09/28/2020    Pneumococcal Polysaccharide - 23 Valent 06/29/2017    Tdap 09/08/2015    Zoster Recombinant 09/28/2020         Reviewed available labs and imaging.     Vitals:    01/06/23 0942   BP: 127/71   BP Location: Left arm   Pulse: (!) 51   Temp: 98.1 °F (36.7 °C)   TempSrc: Oral   Weight: 125.3 kg (276 lb 3.8 oz)   Height: 6' 2" (1.88 m)         Assessment:  Encounter Diagnoses   Name Primary?    Other acute osteomyelitis of right foot Yes   S/p amputation of 3rd toe (11/25/22) unable to confirm clean margins; completed 6wks IV-ceftriaxone for OM (given c/f of 3rd, but also 2nd toe/met head w/ assoc plantar wound), end-date 01/06/23 (today), completed last dose this AM.   -- pt improved, no concerns for active infection.       Plan:     Remove PICC line w/ infusion suite team.  Continue to maintain f/u w/ POD as indicated, and w/ PCP for optimizing health to promote wound healing and prevent recurrence of wounds and potential complications thereof.      Follow-up: PRN      Orders Placed This Encounter   Procedures    PICC line removal    Nursing communication         "

## 2023-11-14 ENCOUNTER — HOSPITAL ENCOUNTER (OUTPATIENT)
Dept: MRI IMAGING | Age: 68
Discharge: HOME | End: 2023-11-14
Payer: MEDICARE

## 2023-11-14 DIAGNOSIS — C79.51 MALIGNANT NEOPLASM METASTATIC TO BONE (HCC): ICD-10-CM

## 2023-11-14 DIAGNOSIS — C34.12 ADENOCARCINOMA OF UPPER LOBE OF LEFT LUNG (HCC): ICD-10-CM

## 2023-11-14 LAB — POC CREATININE WHOLE BLOOD: 0.7 MG/DL (ref 0.5–1.2)

## 2023-11-14 PROCEDURE — A9579 GAD-BASE MR CONTRAST NOS,1ML: HCPCS | Performed by: NURSE PRACTITIONER

## 2023-11-14 PROCEDURE — 70553 MRI BRAIN STEM W/O & W/DYE: CPT

## 2023-11-14 PROCEDURE — 6360000004 HC RX CONTRAST MEDICATION: Performed by: NURSE PRACTITIONER

## 2023-11-14 PROCEDURE — 82565 ASSAY OF CREATININE: CPT

## 2023-11-14 RX ADMIN — GADOTERIDOL 15 ML: 279.3 INJECTION, SOLUTION INTRAVENOUS at 18:33

## 2023-11-17 DIAGNOSIS — C34.12 MALIGNANT NEOPLASM OF UPPER LOBE OF LEFT LUNG (HCC): Primary | ICD-10-CM

## 2023-11-29 DIAGNOSIS — C34.12 MALIGNANT NEOPLASM OF UPPER LOBE OF LEFT LUNG (HCC): Primary | ICD-10-CM

## 2023-11-29 DIAGNOSIS — R97.8 OTHER ABNORMAL TUMOR MARKERS: ICD-10-CM

## 2023-11-29 DIAGNOSIS — R97.0 CARCINOEMBRYONIC ANTIGEN (CEA) ELEVATION: ICD-10-CM

## 2023-11-29 NOTE — PROGRESS NOTES
head, likely malignant. 4. Lytic lesion at the left lateral fourth rib highly suspicious for osseous metastatic disease. Final report electronically signed by Dr. Ulysses Carrillo on 11/1/2023 3:25 PM    XR CHEST (2 VW)    Result Date: 10/30/2023  Impression: Status post left upper lobe mass biopsy, there are no signs of pneumothorax. This document has been electronically signed by: Garett Delgado MD on 10/30/2023 05:32 PM      ASSESSMENT:    Metastatic left upper lung adenocarcinoma with mets to LNs and bones-    - PET scan on 11/1/2023 showed FDG avid left lung mass, 8.7 cm with SUV of 34.2, adjacent nodules also likely malignant, left hilar lymphadenopathy continues in the past, FDG avid mediastinal lymphadenopathy, FDG avid nodule in adjacent to pancreatic head likely malignant, lytic lesion at left lateral fourth rib and right femur highly suspicious for osseous metastatic disease.   - s/p EBUS on 10/20/2023- left upper lobe lung biopsy showed poorly differentiated carcinoma with rhabdoid features. Tumor cells express pankeratin and lack expression of CK5/6, p63, TTF-1, Napsin A, CDX2, PAX8. FNA of 4R/4L station positive for malignancy, tumor cells demonstrated atypical clustered epithelioid cells with nucleoli and moderate cytological atypia, morphology consistent with adenocarcinoma. - MRI brain on 11/14/23 negative for metastatic disease    -Foundation one from tissue- [id number], NF2 deletion, STK11, TP53, U2AF1, negative for ALK, BRAF, EGFR, ERBB2, MET, RET, ROS1, microsatellite stable, TMB 4. PDL1 0% (22C3 and 28-8). 2. Microcytic anemia- ACD +/- iron deficiency     3. Low appetite and weight loss    PLAN:  Patient likely has lung cancer with metastasis to bone (4th rib and right femur head) and pancreatic head area. Check labs today including CA 19-9. Plan for systemic chemoimmunotherapy with pembrolizumab/carboplatin/pemetrexed. Refer for Mediport placement.   Side effects and logistics of

## 2023-11-30 ENCOUNTER — OFFICE VISIT (OUTPATIENT)
Dept: ONCOLOGY | Age: 68
End: 2023-11-30
Payer: MEDICARE

## 2023-11-30 ENCOUNTER — HOSPITAL ENCOUNTER (OUTPATIENT)
Dept: INFUSION THERAPY | Age: 68
Discharge: HOME | End: 2023-11-30
Payer: MEDICARE

## 2023-11-30 VITALS
HEIGHT: 71 IN | WEIGHT: 146 LBS | DIASTOLIC BLOOD PRESSURE: 63 MMHG | OXYGEN SATURATION: 96 % | HEART RATE: 113 BPM | RESPIRATION RATE: 16 BRPM | TEMPERATURE: 98.6 F | SYSTOLIC BLOOD PRESSURE: 139 MMHG | BODY MASS INDEX: 20.44 KG/M2

## 2023-11-30 VITALS
TEMPERATURE: 98.6 F | RESPIRATION RATE: 16 BRPM | OXYGEN SATURATION: 96 % | DIASTOLIC BLOOD PRESSURE: 63 MMHG | SYSTOLIC BLOOD PRESSURE: 139 MMHG | HEART RATE: 113 BPM

## 2023-11-30 DIAGNOSIS — D64.9 ANEMIA, UNSPECIFIED TYPE: ICD-10-CM

## 2023-11-30 DIAGNOSIS — D64.9 ANEMIA, UNSPECIFIED TYPE: Primary | ICD-10-CM

## 2023-11-30 DIAGNOSIS — R97.8 OTHER ABNORMAL TUMOR MARKERS: ICD-10-CM

## 2023-11-30 DIAGNOSIS — C34.12 MALIGNANT NEOPLASM OF UPPER LOBE OF LEFT LUNG (HCC): ICD-10-CM

## 2023-11-30 DIAGNOSIS — R97.0 CARCINOEMBRYONIC ANTIGEN (CEA) ELEVATION: ICD-10-CM

## 2023-11-30 LAB
ABO GROUP BLD: NORMAL
ABO GROUP BLD: NORMAL
ABSOLUTE IMMATURE GRANULOCYTE: 0.09 THOU/MM3 (ref 0–0.07)
ALBUMIN SERPL BCG-MCNC: 2.3 G/DL (ref 3.5–5.1)
ALP SERPL-CCNC: 228 U/L (ref 38–126)
ALT SERPL W/O P-5'-P-CCNC: 10 U/L (ref 11–66)
ANTIBODY SCREEN: NORMAL
AST SERPL-CCNC: 17 U/L (ref 5–40)
BASOPHILS ABSOLUTE: 0 THOU/MM3 (ref 0–0.1)
BASOPHILS NFR BLD AUTO: 0 % (ref 0–3)
BILIRUB CONJ SERPL-MCNC: < 0.2 MG/DL (ref 0–0.3)
BILIRUB SERPL-MCNC: 0.4 MG/DL (ref 0.3–1.2)
BUN BLDP-MCNC: 26 MG/DL (ref 8–26)
CHLORIDE BLD-SCNC: 96 MEQ/L (ref 98–109)
CORTIS SERPL-MCNC: 31.28 UG/DL
CORTISOL COLLECTION INFO: NORMAL
CREAT BLD-MCNC: 0.6 MG/DL (ref 0.5–1.2)
EOSINOPHIL NFR BLD AUTO: 0 % (ref 0–4)
EOSINOPHILS ABSOLUTE: 0.1 THOU/MM3 (ref 0–0.4)
ERYTHROCYTE [DISTWIDTH] IN BLOOD BY AUTOMATED COUNT: 18.1 % (ref 11.5–14.5)
FERRITIN SERPL IA-MCNC: 3448 NG/ML (ref 22–322)
FOLATE SERPL-MCNC: 3.1 NG/ML (ref 4.8–24.2)
GFR SERPL CREATININE-BSD FRML MDRD: > 60 ML/MIN/1.73M2
GLUCOSE BLD-MCNC: 135 MG/DL (ref 70–108)
HBV SURFACE AB SER QL IA: NEGATIVE
HBV SURFACE AG SERPL QL IA: NEGATIVE
HCT VFR BLD AUTO: 25.9 % (ref 42–52)
HGB BLD-MCNC: 7.3 GM/DL (ref 14–18)
IMMATURE GRANULOCYTES: 1 %
IONIZED CALCIUM, WHOLE BLOOD: 1.27 MMOL/L (ref 1.12–1.32)
IRON SATN MFR SERPL: 11 % (ref 20–50)
IRON SERPL-MCNC: 14 UG/DL (ref 65–195)
LYMPHOCYTES ABSOLUTE: 1.5 THOU/MM3 (ref 1–4.8)
LYMPHOCYTES NFR BLD AUTO: 8 % (ref 15–47)
MCH RBC QN AUTO: 21.8 PG (ref 26–33)
MCHC RBC AUTO-ENTMCNC: 28.2 GM/DL (ref 32.2–35.5)
MCV RBC AUTO: 77 FL (ref 80–94)
MONOCYTES ABSOLUTE: 1.1 THOU/MM3 (ref 0.4–1.3)
MONOCYTES NFR BLD AUTO: 6 % (ref 0–12)
NEUTROPHILS NFR BLD AUTO: 85 % (ref 43–75)
PLATELET # BLD AUTO: 596 THOU/MM3 (ref 130–400)
PMV BLD AUTO: 8.5 FL (ref 9.4–12.4)
POTASSIUM BLD-SCNC: 4 MEQ/L (ref 3.5–4.9)
PROT SERPL-MCNC: 7.9 G/DL (ref 6.1–8)
RBC # BLD AUTO: 3.35 MILL/MM3 (ref 4.7–6.1)
RH BLD: NORMAL
RH BLD: NORMAL
SEGMENTED NEUTROPHILS ABSOLUTE COUNT: 16 THOU/MM3 (ref 1.8–7.7)
SODIUM BLD-SCNC: 137 MEQ/L (ref 138–146)
TIBC SERPL-MCNC: 125 UG/DL (ref 171–450)
TOTAL CO2, WHOLE BLOOD: 33 MEQ/L (ref 23–33)
TSH SERPL DL<=0.005 MIU/L-ACNC: 1.35 UIU/ML (ref 0.4–4.2)
VIT B12 SERPL-MCNC: 415 PG/ML (ref 211–911)
WBC # BLD AUTO: 18.8 THOU/MM3 (ref 4.8–10.8)

## 2023-11-30 PROCEDURE — 86901 BLOOD TYPING SEROLOGIC RH(D): CPT

## 2023-11-30 PROCEDURE — 3017F COLORECTAL CA SCREEN DOC REV: CPT | Performed by: INTERNAL MEDICINE

## 2023-11-30 PROCEDURE — 80076 HEPATIC FUNCTION PANEL: CPT

## 2023-11-30 PROCEDURE — 86704 HEP B CORE ANTIBODY TOTAL: CPT

## 2023-11-30 PROCEDURE — 82533 TOTAL CORTISOL: CPT

## 2023-11-30 PROCEDURE — 86301 IMMUNOASSAY TUMOR CA 19-9: CPT

## 2023-11-30 PROCEDURE — 86706 HEP B SURFACE ANTIBODY: CPT

## 2023-11-30 PROCEDURE — 83540 ASSAY OF IRON: CPT

## 2023-11-30 PROCEDURE — 99214 OFFICE O/P EST MOD 30 MIN: CPT | Performed by: INTERNAL MEDICINE

## 2023-11-30 PROCEDURE — 80047 BASIC METABLC PNL IONIZED CA: CPT

## 2023-11-30 PROCEDURE — P9016 RBC LEUKOCYTES REDUCED: HCPCS

## 2023-11-30 PROCEDURE — 1036F TOBACCO NON-USER: CPT | Performed by: INTERNAL MEDICINE

## 2023-11-30 PROCEDURE — 84443 ASSAY THYROID STIM HORMONE: CPT

## 2023-11-30 PROCEDURE — 86850 RBC ANTIBODY SCREEN: CPT

## 2023-11-30 PROCEDURE — 86923 COMPATIBILITY TEST ELECTRIC: CPT

## 2023-11-30 PROCEDURE — G8428 CUR MEDS NOT DOCUMENT: HCPCS | Performed by: INTERNAL MEDICINE

## 2023-11-30 PROCEDURE — G8484 FLU IMMUNIZE NO ADMIN: HCPCS | Performed by: INTERNAL MEDICINE

## 2023-11-30 PROCEDURE — 85025 COMPLETE CBC W/AUTO DIFF WBC: CPT

## 2023-11-30 PROCEDURE — 82728 ASSAY OF FERRITIN: CPT

## 2023-11-30 PROCEDURE — 82746 ASSAY OF FOLIC ACID SERUM: CPT

## 2023-11-30 PROCEDURE — 36415 COLL VENOUS BLD VENIPUNCTURE: CPT

## 2023-11-30 PROCEDURE — 86900 BLOOD TYPING SEROLOGIC ABO: CPT

## 2023-11-30 PROCEDURE — 83550 IRON BINDING TEST: CPT

## 2023-11-30 PROCEDURE — 99211 OFF/OP EST MAY X REQ PHY/QHP: CPT

## 2023-11-30 PROCEDURE — 82607 VITAMIN B-12: CPT

## 2023-11-30 PROCEDURE — G8420 CALC BMI NORM PARAMETERS: HCPCS | Performed by: INTERNAL MEDICINE

## 2023-11-30 PROCEDURE — 87340 HEPATITIS B SURFACE AG IA: CPT

## 2023-11-30 PROCEDURE — 1123F ACP DISCUSS/DSCN MKR DOCD: CPT | Performed by: INTERNAL MEDICINE

## 2023-11-30 RX ORDER — FAMOTIDINE 10 MG/ML
20 INJECTION, SOLUTION INTRAVENOUS
OUTPATIENT
Start: 2023-11-30

## 2023-11-30 RX ORDER — ONDANSETRON 2 MG/ML
8 INJECTION INTRAMUSCULAR; INTRAVENOUS
OUTPATIENT
Start: 2023-11-30

## 2023-11-30 RX ORDER — DIPHENHYDRAMINE HYDROCHLORIDE 50 MG/ML
50 INJECTION INTRAMUSCULAR; INTRAVENOUS
OUTPATIENT
Start: 2023-11-30

## 2023-11-30 RX ORDER — SODIUM CHLORIDE 9 MG/ML
5-250 INJECTION, SOLUTION INTRAVENOUS PRN
OUTPATIENT
Start: 2023-11-30

## 2023-11-30 RX ORDER — EPINEPHRINE 1 MG/ML
0.3 INJECTION, SOLUTION, CONCENTRATE INTRAVENOUS PRN
OUTPATIENT
Start: 2023-11-30

## 2023-11-30 RX ORDER — SODIUM CHLORIDE 9 MG/ML
INJECTION, SOLUTION INTRAVENOUS CONTINUOUS
OUTPATIENT
Start: 2023-11-30

## 2023-11-30 RX ORDER — SODIUM CHLORIDE 9 MG/ML
25 INJECTION, SOLUTION INTRAVENOUS PRN
Status: CANCELLED | OUTPATIENT
Start: 2023-11-30

## 2023-11-30 RX ORDER — ACETAMINOPHEN 325 MG/1
650 TABLET ORAL
OUTPATIENT
Start: 2023-11-30

## 2023-11-30 RX ORDER — ALBUTEROL SULFATE 90 UG/1
4 AEROSOL, METERED RESPIRATORY (INHALATION) PRN
OUTPATIENT
Start: 2023-11-30

## 2023-11-30 RX ORDER — SODIUM CHLORIDE 0.9 % (FLUSH) 0.9 %
5-40 SYRINGE (ML) INJECTION PRN
Status: CANCELLED | OUTPATIENT
Start: 2023-11-30

## 2023-11-30 RX ORDER — ACETAMINOPHEN 325 MG/1
650 TABLET ORAL
Status: CANCELLED | OUTPATIENT
Start: 2023-11-30

## 2023-11-30 RX ORDER — PROCHLORPERAZINE MALEATE 10 MG
10 TABLET ORAL EVERY 6 HOURS PRN
Qty: 30 TABLET | Refills: 2 | Status: SHIPPED | OUTPATIENT
Start: 2023-11-30

## 2023-11-30 RX ORDER — EPINEPHRINE 1 MG/ML
0.3 INJECTION, SOLUTION, CONCENTRATE INTRAVENOUS PRN
Status: CANCELLED | OUTPATIENT
Start: 2023-11-30

## 2023-11-30 RX ORDER — FOLIC ACID 1 MG/1
1 TABLET ORAL DAILY
Qty: 90 TABLET | Refills: 1 | Status: SHIPPED | OUTPATIENT
Start: 2023-11-30

## 2023-11-30 RX ORDER — HEPARIN SODIUM (PORCINE) LOCK FLUSH IV SOLN 100 UNIT/ML 100 UNIT/ML
500 SOLUTION INTRAVENOUS PRN
OUTPATIENT
Start: 2023-11-30

## 2023-11-30 RX ORDER — FAMOTIDINE 10 MG/ML
20 INJECTION, SOLUTION INTRAVENOUS
Status: CANCELLED | OUTPATIENT
Start: 2023-11-30

## 2023-11-30 RX ORDER — SODIUM CHLORIDE 9 MG/ML
INJECTION, SOLUTION INTRAVENOUS CONTINUOUS
Status: CANCELLED | OUTPATIENT
Start: 2023-11-30

## 2023-11-30 RX ORDER — MEPERIDINE HYDROCHLORIDE 50 MG/ML
12.5 INJECTION INTRAMUSCULAR; INTRAVENOUS; SUBCUTANEOUS PRN
OUTPATIENT
Start: 2023-11-30

## 2023-11-30 RX ORDER — ALBUTEROL SULFATE 90 UG/1
4 AEROSOL, METERED RESPIRATORY (INHALATION) PRN
Status: CANCELLED | OUTPATIENT
Start: 2023-11-30

## 2023-11-30 RX ORDER — CYANOCOBALAMIN 1000 UG/ML
1000 INJECTION, SOLUTION INTRAMUSCULAR; SUBCUTANEOUS
OUTPATIENT
Start: 2023-11-30

## 2023-11-30 RX ORDER — ONDANSETRON 8 MG/1
8 TABLET, ORALLY DISINTEGRATING ORAL EVERY 8 HOURS PRN
Qty: 30 TABLET | Refills: 2 | Status: SHIPPED | OUTPATIENT
Start: 2023-11-30 | End: 2023-12-30

## 2023-11-30 RX ORDER — SODIUM CHLORIDE 0.9 % (FLUSH) 0.9 %
5-40 SYRINGE (ML) INJECTION PRN
OUTPATIENT
Start: 2023-11-30

## 2023-11-30 RX ORDER — CYANOCOBALAMIN 1000 UG/ML
1000 INJECTION, SOLUTION INTRAMUSCULAR; SUBCUTANEOUS ONCE
OUTPATIENT
Start: 2023-11-30 | End: 2023-11-30

## 2023-11-30 RX ORDER — PALONOSETRON 0.05 MG/ML
0.25 INJECTION, SOLUTION INTRAVENOUS ONCE
OUTPATIENT
Start: 2023-11-30 | End: 2023-11-30

## 2023-11-30 RX ORDER — MIRTAZAPINE 7.5 MG/1
7.5 TABLET, FILM COATED ORAL NIGHTLY
Qty: 30 TABLET | Refills: 1 | Status: SHIPPED | OUTPATIENT
Start: 2023-11-30

## 2023-11-30 RX ORDER — ONDANSETRON 2 MG/ML
8 INJECTION INTRAMUSCULAR; INTRAVENOUS
Status: CANCELLED | OUTPATIENT
Start: 2023-11-30

## 2023-11-30 RX ORDER — DIPHENHYDRAMINE HYDROCHLORIDE 50 MG/ML
50 INJECTION INTRAMUSCULAR; INTRAVENOUS
Status: CANCELLED | OUTPATIENT
Start: 2023-11-30

## 2023-11-30 RX ORDER — LEVOCETIRIZINE DIHYDROCHLORIDE 5 MG/1
5 TABLET, FILM COATED ORAL AS NEEDED
COMMUNITY

## 2023-11-30 RX ORDER — SODIUM CHLORIDE 9 MG/ML
20 INJECTION, SOLUTION INTRAVENOUS CONTINUOUS
Status: CANCELLED | OUTPATIENT
Start: 2023-11-30

## 2023-12-01 ENCOUNTER — HOSPITAL ENCOUNTER (OUTPATIENT)
Dept: INFUSION THERAPY | Age: 68
Discharge: HOME | End: 2023-12-01
Payer: MEDICARE

## 2023-12-01 VITALS
RESPIRATION RATE: 16 BRPM | SYSTOLIC BLOOD PRESSURE: 114 MMHG | TEMPERATURE: 98.6 F | DIASTOLIC BLOOD PRESSURE: 56 MMHG | HEART RATE: 116 BPM | OXYGEN SATURATION: 92 %

## 2023-12-01 DIAGNOSIS — D64.9 ANEMIA, UNSPECIFIED TYPE: Primary | ICD-10-CM

## 2023-12-01 PROCEDURE — 2580000003 HC RX 258: Performed by: INTERNAL MEDICINE

## 2023-12-01 PROCEDURE — P9016 RBC LEUKOCYTES REDUCED: HCPCS

## 2023-12-01 PROCEDURE — 36430 TRANSFUSION BLD/BLD COMPNT: CPT

## 2023-12-01 RX ORDER — SODIUM CHLORIDE 9 MG/ML
20 INJECTION, SOLUTION INTRAVENOUS CONTINUOUS
Status: DISCONTINUED | OUTPATIENT
Start: 2023-12-01 | End: 2023-12-02 | Stop reason: HOSPADM

## 2023-12-01 RX ORDER — SODIUM CHLORIDE 9 MG/ML
25 INJECTION, SOLUTION INTRAVENOUS PRN
Status: DISCONTINUED | OUTPATIENT
Start: 2023-12-01 | End: 2023-12-02 | Stop reason: HOSPADM

## 2023-12-01 RX ORDER — SODIUM CHLORIDE 9 MG/ML
20 INJECTION, SOLUTION INTRAVENOUS CONTINUOUS
Status: CANCELLED | OUTPATIENT
Start: 2023-12-01

## 2023-12-01 RX ORDER — ONDANSETRON 2 MG/ML
8 INJECTION INTRAMUSCULAR; INTRAVENOUS
OUTPATIENT
Start: 2023-12-01

## 2023-12-01 RX ORDER — ALBUTEROL SULFATE 90 UG/1
4 AEROSOL, METERED RESPIRATORY (INHALATION) PRN
OUTPATIENT
Start: 2023-12-01

## 2023-12-01 RX ORDER — SODIUM CHLORIDE 0.9 % (FLUSH) 0.9 %
5-40 SYRINGE (ML) INJECTION PRN
Status: CANCELLED | OUTPATIENT
Start: 2023-12-01

## 2023-12-01 RX ORDER — SODIUM CHLORIDE 9 MG/ML
25 INJECTION, SOLUTION INTRAVENOUS PRN
Status: CANCELLED | OUTPATIENT
Start: 2023-12-01

## 2023-12-01 RX ORDER — SODIUM CHLORIDE 9 MG/ML
INJECTION, SOLUTION INTRAVENOUS CONTINUOUS
OUTPATIENT
Start: 2023-12-01

## 2023-12-01 RX ORDER — EPINEPHRINE 1 MG/ML
0.3 INJECTION, SOLUTION INTRAMUSCULAR; SUBCUTANEOUS PRN
OUTPATIENT
Start: 2023-12-01

## 2023-12-01 RX ORDER — DIPHENHYDRAMINE HYDROCHLORIDE 50 MG/ML
50 INJECTION INTRAMUSCULAR; INTRAVENOUS
OUTPATIENT
Start: 2023-12-01

## 2023-12-01 RX ORDER — ACETAMINOPHEN 325 MG/1
650 TABLET ORAL
OUTPATIENT
Start: 2023-12-01

## 2023-12-01 RX ORDER — SODIUM CHLORIDE 0.9 % (FLUSH) 0.9 %
5-40 SYRINGE (ML) INJECTION PRN
Status: DISCONTINUED | OUTPATIENT
Start: 2023-12-01 | End: 2023-12-02 | Stop reason: HOSPADM

## 2023-12-01 RX ADMIN — SODIUM CHLORIDE 50 ML/HR: 9 INJECTION, SOLUTION INTRAVENOUS at 08:20

## 2023-12-01 RX ADMIN — SODIUM CHLORIDE 20 ML/HR: 9 INJECTION, SOLUTION INTRAVENOUS at 11:38

## 2023-12-01 NOTE — PLAN OF CARE
Problem: Safety - Adult  Goal: Free from fall injury  Outcome: Adequate for Discharge  Flowsheets (Taken 12/1/2023 0811)  Free From Fall Injury: Instruct family/caregiver on patient safety     Problem: Discharge Planning  Goal: Discharge to home or other facility with appropriate resources  Outcome: Adequate for Discharge  Flowsheets (Taken 12/1/2023 7218)  Discharge to home or other facility with appropriate resources: Identify barriers to discharge with patient and caregiver     Problem: Chronic Conditions and Co-morbidities  Goal: Patient's chronic conditions and co-morbidity symptoms are monitored and maintained or improved  Outcome: Adequate for Discharge  Flowsheets (Taken 12/1/2023 0811)  Care Plan - Patient's Chronic Conditions and Co-Morbidity Symptoms are Monitored and Maintained or Improved: Monitor and assess patient's chronic conditions and comorbid symptoms for stability, deterioration, or improvement  Note: Patient educated over blood product transfusion protocol:    Patient receiving 2 units of packed red blood cells:      -  Blood product transfusion information sheet given with questions answered. -  Blood consent signed  -  Take vital signs/ monitor lungs sounds prior to transfusion.  -  Monitor patient for 15 minutes after transfusion started. -  Take vital signs / monitor lungs sounds after first 15 minutes. -  Assess IV site. -  Monitor patient closely for potential transfusion reaction.  -  Take vital signs /monitor lung sounds after the completion of transfusion. Call MD if develop complications prior to discharge. Care plan reviewed with patient and spouse. Patient and spouse verbalize understanding of the plan of care and contribute to goal setting.

## 2023-12-01 NOTE — DISCHARGE INSTRUCTIONS
Patient instructed if experience any of the symptoms following today's blood transfusion / to notify MD immediately or go to emergency department.     * dizziness/lightheadedness  *acute nausea/vomiting - not relieved with medication  *headache - not relieved from Tylenol/pain medication  *chest pain/pressure  *rash/itching  *shortness of breath        Drink fluids - 48oz fluids daily  Call if develop fever/ chills/ signs or symptoms of infection

## 2023-12-02 LAB
CANCER AG19-9 SERPL-ACNC: 11 U/ML (ref 0–35)
HBV CORE IGG+IGM SERPL QL IA: NONREACTIVE

## 2023-12-05 ENCOUNTER — TELEPHONE (OUTPATIENT)
Dept: INFUSION THERAPY | Age: 68
End: 2023-12-05

## 2023-12-05 ENCOUNTER — CLINICAL DOCUMENTATION (OUTPATIENT)
Dept: CASE MANAGEMENT | Age: 68
End: 2023-12-05

## 2023-12-05 ENCOUNTER — HOSPITAL ENCOUNTER (OUTPATIENT)
Dept: INFUSION THERAPY | Age: 68
Discharge: HOME | End: 2023-12-05
Payer: MEDICARE

## 2023-12-05 VITALS
DIASTOLIC BLOOD PRESSURE: 65 MMHG | SYSTOLIC BLOOD PRESSURE: 130 MMHG | OXYGEN SATURATION: 93 % | WEIGHT: 142 LBS | HEIGHT: 72 IN | BODY MASS INDEX: 19.23 KG/M2 | HEART RATE: 103 BPM | RESPIRATION RATE: 18 BRPM | TEMPERATURE: 97.9 F

## 2023-12-05 PROCEDURE — 99212 OFFICE O/P EST SF 10 MIN: CPT

## 2023-12-05 NOTE — PROGRESS NOTES
Chemotherapy/Immunotherapy Teaching Checklist    Treatment Plan: Alimta, Keytruda, Carboplatin  Frequency: every 3 weeks  Patient accompanied by  Juan Antonio Riggs Wife      Day of chemo instructions:  [x] Must have    [x] Eat light breakfast  [x] Bring pain medications/other routine medications scheduled during appointment time  [] Hold blood pressure medications morning of treatment    Treatment process:  [x] Flow of appointment  [x] IV access Peripheral start  or  PORT access process [x] EMLA cream  [x] Premedication/ Hydration  [x] Length of treatment  [x] Tour of clinic    Diet and hydration:  [x] Discuss [place] diet    [x] Eating Hints book provided  [x] Importance of hydration 48- 64 ounces daily   [x] Hydration handout provided     Side Effects:  [x] Chemotherapy and you book provided  [x] Side effects and management discussed   [x] Diarrhea[x]Nausea/Vomiting []home antiemetic [x]hair loss[x]Neuropathy[x] Constipation[x] Fatigue[x]Mouth sores[x] Dehydration[x] Skin/Nail Changes []Pneumonitis []Colitis [] Hepatitis []Thyroid changes  []Fertility issues (birth control, sperm/egg banking issues)    Labs:  [x]BMP- renal function and electrolytes discussed  [x] CBC- RBC, WBC with ANC, platelet counts discussed  [x]Understanding your Blood hand out given   [x]Neutropenia handout/Reduce infection handout [x]Thrombocytopenia handout   [x]Vimal discussed    Complications that require immediate attention from physician:  [x]Fever 100.3 [x]signs of infection- chills, fever, burning with urination, worsening cough   [x]Uncontrolled vomiting [x]Uncontrolled diarrhea/constipation   [x]Unable to drink 48 ounces [x]Uncontrolled pain  [x] When to notify provider handout given  [x] After hours contact process discussed    Home instructions:  [x] Instruct to shut the lid and double flush toilet for 48 hours after chemotherapy  [x] Instruct family members to wear gloves when will be handling  body fluids    Support Services:  []

## 2023-12-05 NOTE — TELEPHONE ENCOUNTER
There was insufficient tissue to run [place]  PDL-1  [employer] testing. In basket message sent to Dr Kai Lopez.

## 2023-12-05 NOTE — DISCHARGE INSTRUCTIONS
Patient here for chemo teaching. Patient verbalized understanding of discharge instructions. Ambulated off unit per self with belongings.

## 2023-12-06 ENCOUNTER — OFFICE VISIT (OUTPATIENT)
Dept: ONCOLOGY | Age: 68
End: 2023-12-06
Payer: MEDICARE

## 2023-12-06 ENCOUNTER — HOSPITAL ENCOUNTER (OUTPATIENT)
Dept: INFUSION THERAPY | Age: 68
Discharge: HOME | End: 2023-12-06
Payer: MEDICARE

## 2023-12-06 ENCOUNTER — CLINICAL DOCUMENTATION (OUTPATIENT)
Dept: CASE MANAGEMENT | Age: 68
End: 2023-12-06

## 2023-12-06 VITALS
RESPIRATION RATE: 18 BRPM | OXYGEN SATURATION: 93 % | TEMPERATURE: 97.6 F | SYSTOLIC BLOOD PRESSURE: 131 MMHG | BODY MASS INDEX: 20.3 KG/M2 | HEIGHT: 71 IN | HEART RATE: 98 BPM | WEIGHT: 145 LBS | DIASTOLIC BLOOD PRESSURE: 62 MMHG

## 2023-12-06 VITALS
HEART RATE: 98 BPM | RESPIRATION RATE: 18 BRPM | OXYGEN SATURATION: 93 % | SYSTOLIC BLOOD PRESSURE: 131 MMHG | DIASTOLIC BLOOD PRESSURE: 62 MMHG | TEMPERATURE: 97.6 F

## 2023-12-06 VITALS
HEART RATE: 98 BPM | DIASTOLIC BLOOD PRESSURE: 62 MMHG | SYSTOLIC BLOOD PRESSURE: 131 MMHG | BODY MASS INDEX: 19.64 KG/M2 | WEIGHT: 145 LBS | OXYGEN SATURATION: 93 % | HEIGHT: 72 IN | TEMPERATURE: 97.6 F | RESPIRATION RATE: 18 BRPM

## 2023-12-06 DIAGNOSIS — D64.9 ANEMIA, UNSPECIFIED TYPE: ICD-10-CM

## 2023-12-06 DIAGNOSIS — C34.12 MALIGNANT NEOPLASM OF UPPER LOBE OF LEFT LUNG (HCC): Primary | ICD-10-CM

## 2023-12-06 DIAGNOSIS — R97.8 OTHER ABNORMAL TUMOR MARKERS: ICD-10-CM

## 2023-12-06 DIAGNOSIS — K86.89 PANCREATIC MASS: ICD-10-CM

## 2023-12-06 LAB
ABSOLUTE IMMATURE GRANULOCYTE: 0.07 THOU/MM3 (ref 0–0.07)
BASOPHILS ABSOLUTE: 0 THOU/MM3 (ref 0–0.1)
BASOPHILS NFR BLD AUTO: 0 % (ref 0–3)
EOSINOPHIL NFR BLD AUTO: 0 % (ref 0–4)
EOSINOPHILS ABSOLUTE: 0 THOU/MM3 (ref 0–0.4)
ERYTHROCYTE [DISTWIDTH] IN BLOOD BY AUTOMATED COUNT: 19 % (ref 11.5–14.5)
HCT VFR BLD AUTO: 30 % (ref 42–52)
HGB BLD-MCNC: 8.7 GM/DL (ref 14–18)
IMMATURE GRANULOCYTES: 0 %
LYMPHOCYTES ABSOLUTE: 1.4 THOU/MM3 (ref 1–4.8)
LYMPHOCYTES NFR BLD AUTO: 8 % (ref 15–47)
MCH RBC QN AUTO: 23.3 PG (ref 26–33)
MCHC RBC AUTO-ENTMCNC: 29 GM/DL (ref 32.2–35.5)
MCV RBC AUTO: 80 FL (ref 80–94)
MONOCYTES ABSOLUTE: 1.4 THOU/MM3 (ref 0.4–1.3)
MONOCYTES NFR BLD AUTO: 7 % (ref 0–12)
NEUTROPHILS NFR BLD AUTO: 84 % (ref 43–75)
PLATELET # BLD AUTO: 581 THOU/MM3 (ref 130–400)
PMV BLD AUTO: 8.7 FL (ref 9.4–12.4)
RBC # BLD AUTO: 3.73 MILL/MM3 (ref 4.7–6.1)
SEGMENTED NEUTROPHILS ABSOLUTE COUNT: 15.8 THOU/MM3 (ref 1.8–7.7)
WBC # BLD AUTO: 18.7 THOU/MM3 (ref 4.8–10.8)

## 2023-12-06 PROCEDURE — G8427 DOCREV CUR MEDS BY ELIG CLIN: HCPCS | Performed by: INTERNAL MEDICINE

## 2023-12-06 PROCEDURE — G8420 CALC BMI NORM PARAMETERS: HCPCS | Performed by: INTERNAL MEDICINE

## 2023-12-06 PROCEDURE — 1123F ACP DISCUSS/DSCN MKR DOCD: CPT | Performed by: INTERNAL MEDICINE

## 2023-12-06 PROCEDURE — 1036F TOBACCO NON-USER: CPT | Performed by: INTERNAL MEDICINE

## 2023-12-06 PROCEDURE — 6360000002 HC RX W HCPCS: Performed by: INTERNAL MEDICINE

## 2023-12-06 PROCEDURE — G8484 FLU IMMUNIZE NO ADMIN: HCPCS | Performed by: INTERNAL MEDICINE

## 2023-12-06 PROCEDURE — 3017F COLORECTAL CA SCREEN DOC REV: CPT | Performed by: INTERNAL MEDICINE

## 2023-12-06 PROCEDURE — 99214 OFFICE O/P EST MOD 30 MIN: CPT | Performed by: INTERNAL MEDICINE

## 2023-12-06 PROCEDURE — 96372 THER/PROPH/DIAG INJ SC/IM: CPT

## 2023-12-06 PROCEDURE — 86301 IMMUNOASSAY TUMOR CA 19-9: CPT

## 2023-12-06 PROCEDURE — 99211 OFF/OP EST MAY X REQ PHY/QHP: CPT

## 2023-12-06 PROCEDURE — 85025 COMPLETE CBC W/AUTO DIFF WBC: CPT

## 2023-12-06 PROCEDURE — 36415 COLL VENOUS BLD VENIPUNCTURE: CPT

## 2023-12-06 RX ORDER — BENZONATATE 100 MG/1
100 CAPSULE ORAL 3 TIMES DAILY PRN
Qty: 90 CAPSULE | Refills: 1 | Status: ON HOLD | OUTPATIENT
Start: 2023-12-06 | End: 2024-01-05

## 2023-12-06 RX ORDER — CYANOCOBALAMIN 1000 UG/ML
1000 INJECTION, SOLUTION INTRAMUSCULAR; SUBCUTANEOUS ONCE
Status: COMPLETED | OUTPATIENT
Start: 2023-12-06 | End: 2023-12-06

## 2023-12-06 RX ADMIN — CYANOCOBALAMIN 1000 MCG: 1000 INJECTION, SOLUTION INTRAMUSCULAR; SUBCUTANEOUS at 14:06

## 2023-12-06 NOTE — PROGRESS NOTES
Patient tolerated  B-12 IM injection without any complications. Discharge instructions given to patient-verbalizes understanding. Ambulated off unit per self with belongings.

## 2023-12-06 NOTE — DISCHARGE INSTRUCTIONS
Reviewed the pathology, imaging studies and probable diagnosis. Check CA 19-9. Ordered MRI abdomen with and without contrast.  Provided literature on the regimen. Tessalon perles 100 mg tid as needed for cough sent to the pharmacy. They will follow up with Dr. Maddie Lujan. Vitamin B12 injection today. Chemo as planned. Please contact your Oncologist if you have any questions regarding the B-12 injection that you received today. You are instructed to call the office or go to the Emergency Dept. If you experience any of the following symptoms:    Dizziness/lightheadedness   Acute nausea or vomiting-not relieved by medications  Headaches-not relieved by medications  New chest pain or pressure  New rash /itching  New shortness of breath  Fever,chills or signs or symptoms of infection    Make sure you are drinking 48 to 64 ounces of water daily-if you are unable to drink fluids let us know right away.

## 2023-12-06 NOTE — PROGRESS NOTES
New chemotherapy validation note:    Diagnosis for chemotherapy: NSCLC with mets to LN and bone        Regimen ordered: Pembo + Pemetrexed/Carboplatin        Reference or literature used for validation: NCCN     Date literature or guideline last updated 2/2/23     Deviation from literature or guideline used: Rounded Alimta from 910mg to 900mg    Summary of any verbal or telephone information obtained: n/a

## 2023-12-06 NOTE — PROGRESS NOTES
Alert and oriented, no focal deficits   MUSCULOSKELETAL: no cyanosis, clubbing or edema in the extremities. IMAGING:  PET CT SKULL BASE TO MID THIGH  Result Date: 11/1/2023  1. FDG avid left lung mass, likely malignant. Adjacent nodules are also likely malignant. 2. FDG avid left mediastinal left hilar lymphadenopathy, likely metastatic disease. 3. FDG avid nodule in or adjacent to the pancreatic head, likely malignant. 4. Lytic lesion at the left lateral fourth rib highly suspicious for osseous metastatic disease. Final report electronically signed by Dr. Greg Hernandez on 11/1/2023 3:25 PM    XR CHEST (2 VW)    Result Date: 10/30/2023  Impression: Status post left upper lobe mass biopsy, there are no signs of pneumothorax. This document has been electronically signed by: Peggy Medrano MD on 10/30/2023 05:32 PM      ASSESSMENT:    Metastatic left upper lung adenocarcinoma with mets to LNs and bones-    - PET scan on 11/1/2023 showed FDG avid left lung mass, 8.7 cm with SUV of 34.2, adjacent nodules also likely malignant, left hilar lymphadenopathy continues in the past, FDG avid mediastinal lymphadenopathy, FDG avid nodule in adjacent to pancreatic head likely malignant, lytic lesion at left lateral fourth rib and right femur highly suspicious for osseous metastatic disease.   - s/p EBUS on 10/20/2023- left upper lobe lung biopsy showed poorly differentiated carcinoma with rhabdoid features. Tumor cells express pankeratin and lack expression of CK5/6, p63, TTF-1, Napsin A, CDX2, PAX8. FNA of 4R/4L station positive for malignancy, tumor cells demonstrated atypical clustered epithelioid cells with nucleoli and moderate cytological atypia, morphology consistent with adenocarcinoma. - MRI brain on 11/14/23 negative for metastatic disease    -Foundation one from tissue- [id number], NF2 deletion, STK11, TP53, U2AF1, negative for ALK, BRAF, EGFR, ERBB2, MET, RET, ROS1, microsatellite stable, TMB 4.  PDL1 0% (22C3 and

## 2023-12-06 NOTE — PROGRESS NOTES
Name: Lakhwinder Nascimento  : 1955  MRN: [id number]    Oncology Navigation Follow-Up Note    Contact Type:  Medical Oncology- patient and spouse in infusion clinic for chemotherapy education by RN. Notes: Pt and spouse wanted to speak to me. Spouse was very tearful. They have a lot of unanswered questions mainly in reference to prognosis. Stated\" Dr. Speedy Barber never answered all of their questions. We feel lost. Here for teaching today and to have port  in on Friday. We don't know how much time he may have- it is worth going through all of this. We just need some more answers\". Jose instructed to at least hear the nurse education information. I would talk to them after. Jose looked at Dr. Speedy Barber schedule to see if any availability to talk with them . He is on vacation until . Jose spoke to Dr. Viktoria Arriola about the situation. Dr. Viktoria Arriola is willing to meet with them tomorrow and discuss his case if they would like. Patient and spouse are both willing to meet with Dr. Viktoria Arriola. Apt  @ 1:30 PM  given.      Electronically signed by Shanna Landaverde RN on 2023 at 8:29 AM

## 2023-12-06 NOTE — PATIENT INSTRUCTIONS
Reviewed the pathology, imaging studies and probable diagnosis. Check CA 19-9. Ordered MRI abdomen with and without contrast.  Provided literature on the regimen. Tessalon perles 100 mg tid as needed for cough sent to the pharmacy. They will follow up with Dr. Dulce Lomax. Vitamin B12 injection today. Chemo as planned.

## 2023-12-06 NOTE — PLAN OF CARE
Problem: Safety - Adult  Goal: Free from fall injury  Outcome: Adequate for Discharge  Flowsheets (Taken 12/6/2023 1648)  Free From Fall Injury:   Instruct family/caregiver on patient safety   Based on caregiver fall risk screen, instruct family/caregiver to ask for assistance with transferring infant if caregiver noted to have fall risk factors  Note: Free from falls while in O.P. Oncology. Problem: Discharge Planning  Goal: Discharge to home or other facility with appropriate resources  Outcome: Adequate for Discharge  Flowsheets (Taken 12/6/2023 1648)  Discharge to home or other facility with appropriate resources:   Identify barriers to discharge with patient and caregiver   Identify discharge learning needs (meds, wound care, etc)  Note: Verbalize understanding of discharge instructions, follow up appointments, and when to call Physician. Problem: Chronic Conditions and Co-morbidities  Goal: Patient's chronic conditions and co-morbidity symptoms are monitored and maintained or improved  Outcome: Adequate for Discharge  Flowsheets (Taken 12/6/2023 1648)  Care Plan - Patient's Chronic Conditions and Co-Morbidity Symptoms are Monitored and Maintained or Improved:   Monitor and assess patient's chronic conditions and comorbid symptoms for stability, deterioration, or improvement   Collaborate with multidisciplinary team to address chronic and comorbid conditions and prevent exacerbation or deterioration  Note: Discuss understanding to verbal information given on vitamin B-12 injection. Care plan reviewed with patient and family. Patient and family verbalize understanding of the plan of care and contribute to goal setting.

## 2023-12-07 LAB — CANCER AG19-9 SERPL-ACNC: 11 U/ML (ref 0–35)

## 2023-12-07 NOTE — PROGRESS NOTES
Name: Yoanna Duran  : 1955  MRN: [id number]    Oncology Navigation Follow-Up Note    Contact Type:  Medical Oncology  Spouse Juan Antonio Riggs accompanied appointment     [facility]:  MD had a discussion about his diagnosis and prognosis. MD discussed her recommendations-they agreed. Questions answered satisfactory. Proceeding with chemo for lung caner.  GI consult/MRI abdomen- evaluate lite up spot in pancreas on PET   - keep GI apt next week  - ordered MRI abdomen  - lab CA 19-9 today  -B12 injection today  -proceed with mediport   -proceed with chemo   - [address] for cough    Assistance Needed: denies any at this time    Receptive to [address] [place] [address] / Palliative Care:  deferred      Education: reiterated ONC POC    Notes: maria elena following to assist & support as needed    Electronically signed by Satinder Mendez RN on 2023 at 7:45 AM

## 2023-12-08 ENCOUNTER — HOSPITAL ENCOUNTER (INPATIENT)
Age: 68
LOS: 6 days | Discharge: HOSPICE | End: 2023-12-14
Attending: EMERGENCY MEDICINE | Admitting: INTERNAL MEDICINE
Payer: MEDICARE

## 2023-12-08 ENCOUNTER — HOSPITAL ENCOUNTER (OUTPATIENT)
Dept: INTERVENTIONAL RADIOLOGY/VASCULAR | Age: 68
Discharge: HOME | End: 2023-12-08
Payer: MEDICARE

## 2023-12-08 ENCOUNTER — APPOINTMENT (OUTPATIENT)
Dept: CT IMAGING | Age: 68
End: 2023-12-08
Payer: MEDICARE

## 2023-12-08 VITALS
OXYGEN SATURATION: 91 % | BODY MASS INDEX: 19.53 KG/M2 | DIASTOLIC BLOOD PRESSURE: 63 MMHG | SYSTOLIC BLOOD PRESSURE: 135 MMHG | WEIGHT: 140 LBS | RESPIRATION RATE: 20 BRPM | HEART RATE: 109 BPM | TEMPERATURE: 98.9 F

## 2023-12-08 DIAGNOSIS — R09.02 HYPOXIA: ICD-10-CM

## 2023-12-08 DIAGNOSIS — J90 PLEURAL EFFUSION ON LEFT: Primary | ICD-10-CM

## 2023-12-08 DIAGNOSIS — C79.51 LUNG CANCER METASTATIC TO BONE (HCC): ICD-10-CM

## 2023-12-08 DIAGNOSIS — C34.12 MALIGNANT NEOPLASM OF UPPER LOBE OF LEFT LUNG (HCC): ICD-10-CM

## 2023-12-08 DIAGNOSIS — C34.90 LUNG CANCER METASTATIC TO BONE (HCC): ICD-10-CM

## 2023-12-08 DIAGNOSIS — Z87.891 PERSONAL HISTORY OF TOBACCO USE: ICD-10-CM

## 2023-12-08 DIAGNOSIS — E43 SEVERE MALNUTRITION (HCC): ICD-10-CM

## 2023-12-08 DIAGNOSIS — C34.12 MALIGNANT NEOPLASM OF UPPER LOBE, LEFT BRONCHUS OR LUNG (HCC): ICD-10-CM

## 2023-12-08 DIAGNOSIS — Z51.5 PALLIATIVE CARE PATIENT: ICD-10-CM

## 2023-12-08 DIAGNOSIS — G89.3 CANCER RELATED PAIN: ICD-10-CM

## 2023-12-08 PROBLEM — J96.01 ACUTE RESPIRATORY FAILURE WITH HYPOXIA (HCC): Status: ACTIVE | Noted: 2023-12-08

## 2023-12-08 LAB
ALBUMIN SERPL BCG-MCNC: 2.2 G/DL (ref 3.5–5.1)
ALP SERPL-CCNC: 163 U/L (ref 38–126)
ALT SERPL W/O P-5'-P-CCNC: 9 U/L (ref 11–66)
ANION GAP SERPL CALC-SCNC: 8 MEQ/L (ref 8–16)
AST SERPL-CCNC: 15 U/L (ref 5–40)
BASOPHILS ABSOLUTE: 0 THOU/MM3 (ref 0–0.1)
BASOPHILS NFR BLD AUTO: 0.2 %
BILIRUB SERPL-MCNC: 0.4 MG/DL (ref 0.3–1.2)
BUN SERPL-MCNC: 19 MG/DL (ref 7–22)
CALCIUM SERPL-MCNC: 10 MG/DL (ref 8.5–10.5)
CHLORIDE SERPL-SCNC: 97 MEQ/L (ref 98–111)
CO2 SERPL-SCNC: 32 MEQ/L (ref 23–33)
CREAT SERPL-MCNC: 0.4 MG/DL (ref 0.4–1.2)
DEPRECATED RDW RBC AUTO: 56.3 FL (ref 35–45)
DEPRECATED RDW RBC AUTO: 56.8 FL (ref 35–45)
EOSINOPHIL NFR BLD AUTO: 0.1 %
EOSINOPHILS ABSOLUTE: 0 THOU/MM3 (ref 0–0.4)
ERYTHROCYTE [DISTWIDTH] IN BLOOD BY AUTOMATED COUNT: 19.1 % (ref 11.5–14.5)
ERYTHROCYTE [DISTWIDTH] IN BLOOD BY AUTOMATED COUNT: 19.3 % (ref 11.5–14.5)
FLUAV RNA RESP QL NAA+PROBE: NOT DETECTED
FLUBV RNA RESP QL NAA+PROBE: NOT DETECTED
GFR SERPL CREATININE-BSD FRML MDRD: > 60 ML/MIN/1.73M2
GLUCOSE SERPL-MCNC: 112 MG/DL (ref 70–108)
HCT VFR BLD AUTO: 28.7 % (ref 42–52)
HCT VFR BLD AUTO: 29.3 % (ref 42–52)
HGB BLD-MCNC: 8.2 GM/DL (ref 14–18)
HGB BLD-MCNC: 8.4 GM/DL (ref 14–18)
HYPOCHROMIA BLD QL SMEAR: PRESENT
IMM GRANULOCYTES # BLD AUTO: 0.11 THOU/MM3 (ref 0–0.07)
IMM GRANULOCYTES NFR BLD AUTO: 0.7 %
LACTIC ACID, SEPSIS: 1.3 MMOL/L (ref 0.5–1.9)
LDH SERPL L TO P-CCNC: 108 U/L (ref 100–190)
LYMPHOCYTES ABSOLUTE: 1.4 THOU/MM3 (ref 1–4.8)
LYMPHOCYTES NFR BLD AUTO: 8.4 %
MAGNESIUM SERPL-MCNC: 2 MG/DL (ref 1.6–2.4)
MCH RBC QN AUTO: 23.2 PG (ref 26–33)
MCH RBC QN AUTO: 23.2 PG (ref 26–33)
MCHC RBC AUTO-ENTMCNC: 28.6 GM/DL (ref 32.2–35.5)
MCHC RBC AUTO-ENTMCNC: 28.7 GM/DL (ref 32.2–35.5)
MCV RBC AUTO: 80.9 FL (ref 80–94)
MCV RBC AUTO: 81.3 FL (ref 80–94)
MONOCYTES ABSOLUTE: 1.3 THOU/MM3 (ref 0.4–1.3)
MONOCYTES NFR BLD AUTO: 7.8 %
NEUTROPHILS NFR BLD AUTO: 82.8 %
NRBC BLD AUTO-RTO: 0 /100 WBC
OSMOLALITY SERPL CALC.SUM OF ELEC: 276.8 MOSMOL/KG (ref 275–300)
PLATELET # BLD AUTO: 469 THOU/MM3 (ref 130–400)
PLATELET # BLD AUTO: 499 THOU/MM3 (ref 130–400)
PMV BLD AUTO: 8.7 FL (ref 9.4–12.4)
PMV BLD AUTO: 8.8 FL (ref 9.4–12.4)
POTASSIUM SERPL-SCNC: 3.5 MEQ/L (ref 3.5–5.2)
PROT SERPL-MCNC: 7 G/DL (ref 6.1–8)
PROT SERPL-MCNC: 7 G/DL (ref 6.1–8)
RBC # BLD AUTO: 3.53 MILL/MM3 (ref 4.7–6.1)
RBC # BLD AUTO: 3.62 MILL/MM3 (ref 4.7–6.1)
REASON FOR REJECTION: NORMAL
REJECTED TEST: NORMAL
SARS-COV-2 RNA RESP QL NAA+PROBE: NOT DETECTED
SEGMENTED NEUTROPHILS ABSOLUTE COUNT: 13.7 THOU/MM3 (ref 1.8–7.7)
SODIUM SERPL-SCNC: 137 MEQ/L (ref 135–145)
TROPONIN, HIGH SENSITIVITY: 35 NG/L (ref 0–12)
TSH SERPL DL<=0.005 MIU/L-ACNC: 1.01 UIU/ML (ref 0.4–4.2)
WBC # BLD AUTO: 16.6 THOU/MM3 (ref 4.8–10.8)
WBC # BLD AUTO: 16.7 THOU/MM3 (ref 4.8–10.8)

## 2023-12-08 PROCEDURE — 99285 EMERGENCY DEPT VISIT HI MDM: CPT

## 2023-12-08 PROCEDURE — 2709999900 IR FLUORO GUIDED CVA DEVICE PLMT/REPLACE/REMOVAL

## 2023-12-08 PROCEDURE — 6360000002 HC RX W HCPCS: Performed by: INTERNAL MEDICINE

## 2023-12-08 PROCEDURE — 85027 COMPLETE CBC AUTOMATED: CPT

## 2023-12-08 PROCEDURE — 80053 COMPREHEN METABOLIC PANEL: CPT

## 2023-12-08 PROCEDURE — 2580000003 HC RX 258: Performed by: RADIOLOGY

## 2023-12-08 PROCEDURE — 84484 ASSAY OF TROPONIN QUANT: CPT

## 2023-12-08 PROCEDURE — 93005 ELECTROCARDIOGRAM TRACING: CPT | Performed by: EMERGENCY MEDICINE

## 2023-12-08 PROCEDURE — 6360000002 HC RX W HCPCS: Performed by: RADIOLOGY

## 2023-12-08 PROCEDURE — 6370000000 HC RX 637 (ALT 250 FOR IP): Performed by: STUDENT IN AN ORGANIZED HEALTH CARE EDUCATION/TRAINING PROGRAM

## 2023-12-08 PROCEDURE — 83615 LACTATE (LD) (LDH) ENZYME: CPT

## 2023-12-08 PROCEDURE — 77001 FLUOROGUIDE FOR VEIN DEVICE: CPT

## 2023-12-08 PROCEDURE — 36415 COLL VENOUS BLD VENIPUNCTURE: CPT

## 2023-12-08 PROCEDURE — 6360000004 HC RX CONTRAST MEDICATION: Performed by: EMERGENCY MEDICINE

## 2023-12-08 PROCEDURE — 36561 INSERT TUNNELED CV CATH: CPT

## 2023-12-08 PROCEDURE — 6370000000 HC RX 637 (ALT 250 FOR IP): Performed by: INTERNAL MEDICINE

## 2023-12-08 PROCEDURE — 2580000003 HC RX 258: Performed by: STUDENT IN AN ORGANIZED HEALTH CARE EDUCATION/TRAINING PROGRAM

## 2023-12-08 PROCEDURE — 0JH60WZ INSERTION OF TOTALLY IMPLANTABLE VASCULAR ACCESS DEVICE INTO CHEST SUBCUTANEOUS TISSUE AND FASCIA, OPEN APPROACH: ICD-10-PCS | Performed by: RADIOLOGY

## 2023-12-08 PROCEDURE — 6360000002 HC RX W HCPCS: Performed by: STUDENT IN AN ORGANIZED HEALTH CARE EDUCATION/TRAINING PROGRAM

## 2023-12-08 PROCEDURE — 93005 ELECTROCARDIOGRAM TRACING: CPT | Performed by: STUDENT IN AN ORGANIZED HEALTH CARE EDUCATION/TRAINING PROGRAM

## 2023-12-08 PROCEDURE — 83605 ASSAY OF LACTIC ACID: CPT

## 2023-12-08 PROCEDURE — 1200000003 HC TELEMETRY R&B

## 2023-12-08 PROCEDURE — 2580000003 HC RX 258: Performed by: INTERNAL MEDICINE

## 2023-12-08 PROCEDURE — 02H633Z INSERTION OF INFUSION DEVICE INTO RIGHT ATRIUM, PERCUTANEOUS APPROACH: ICD-10-PCS | Performed by: RADIOLOGY

## 2023-12-08 PROCEDURE — 84155 ASSAY OF PROTEIN SERUM: CPT

## 2023-12-08 PROCEDURE — 85025 COMPLETE CBC W/AUTO DIFF WBC: CPT

## 2023-12-08 PROCEDURE — 87636 SARSCOV2 & INF A&B AMP PRB: CPT

## 2023-12-08 PROCEDURE — 87205 SMEAR GRAM STAIN: CPT

## 2023-12-08 PROCEDURE — 84443 ASSAY THYROID STIM HORMONE: CPT

## 2023-12-08 PROCEDURE — 99223 1ST HOSP IP/OBS HIGH 75: CPT | Performed by: INTERNAL MEDICINE

## 2023-12-08 PROCEDURE — 71275 CT ANGIOGRAPHY CHEST: CPT

## 2023-12-08 PROCEDURE — 83735 ASSAY OF MAGNESIUM: CPT

## 2023-12-08 RX ORDER — SODIUM CHLORIDE 0.9 % (FLUSH) 0.9 %
10 SYRINGE (ML) INJECTION PRN
Status: DISCONTINUED | OUTPATIENT
Start: 2023-12-08 | End: 2023-12-14 | Stop reason: HOSPADM

## 2023-12-08 RX ORDER — SODIUM CHLORIDE 9 MG/ML
INJECTION, SOLUTION INTRAVENOUS PRN
Status: DISCONTINUED | OUTPATIENT
Start: 2023-12-08 | End: 2023-12-14 | Stop reason: HOSPADM

## 2023-12-08 RX ORDER — HEPARIN SODIUM (PORCINE) LOCK FLUSH IV SOLN 100 UNIT/ML 100 UNIT/ML
500 SOLUTION INTRAVENOUS ONCE
Status: COMPLETED | OUTPATIENT
Start: 2023-12-08 | End: 2023-12-08

## 2023-12-08 RX ORDER — FOLIC ACID 1 MG/1
1 TABLET ORAL DAILY
Status: DISCONTINUED | OUTPATIENT
Start: 2023-12-08 | End: 2023-12-14 | Stop reason: HOSPADM

## 2023-12-08 RX ORDER — ACETAMINOPHEN 325 MG/1
650 TABLET ORAL EVERY 6 HOURS PRN
Status: DISCONTINUED | OUTPATIENT
Start: 2023-12-08 | End: 2023-12-14 | Stop reason: HOSPADM

## 2023-12-08 RX ORDER — ONDANSETRON 2 MG/ML
4 INJECTION INTRAMUSCULAR; INTRAVENOUS EVERY 6 HOURS PRN
Status: DISCONTINUED | OUTPATIENT
Start: 2023-12-08 | End: 2023-12-14 | Stop reason: HOSPADM

## 2023-12-08 RX ORDER — FENTANYL CITRATE 50 UG/ML
50 INJECTION, SOLUTION INTRAMUSCULAR; INTRAVENOUS ONCE
Status: COMPLETED | OUTPATIENT
Start: 2023-12-08 | End: 2023-12-08

## 2023-12-08 RX ORDER — METOPROLOL SUCCINATE 25 MG/1
25 TABLET, EXTENDED RELEASE ORAL DAILY
Status: DISCONTINUED | OUTPATIENT
Start: 2023-12-08 | End: 2023-12-14 | Stop reason: HOSPADM

## 2023-12-08 RX ORDER — AZITHROMYCIN 250 MG/1
500 TABLET, FILM COATED ORAL NIGHTLY
Status: COMPLETED | OUTPATIENT
Start: 2023-12-08 | End: 2023-12-10

## 2023-12-08 RX ORDER — ACETAMINOPHEN 650 MG/1
650 SUPPOSITORY RECTAL EVERY 6 HOURS PRN
Status: DISCONTINUED | OUTPATIENT
Start: 2023-12-08 | End: 2023-12-14 | Stop reason: HOSPADM

## 2023-12-08 RX ORDER — MIDAZOLAM HYDROCHLORIDE 1 MG/ML
1 INJECTION INTRAMUSCULAR; INTRAVENOUS ONCE
Status: COMPLETED | OUTPATIENT
Start: 2023-12-08 | End: 2023-12-08

## 2023-12-08 RX ORDER — PROCHLORPERAZINE MALEATE 10 MG
10 TABLET ORAL EVERY 6 HOURS PRN
COMMUNITY

## 2023-12-08 RX ORDER — ALBUTEROL SULFATE 90 UG/1
2 AEROSOL, METERED RESPIRATORY (INHALATION) 4 TIMES DAILY PRN
Status: DISCONTINUED | OUTPATIENT
Start: 2023-12-08 | End: 2023-12-14 | Stop reason: HOSPADM

## 2023-12-08 RX ORDER — SODIUM CHLORIDE 450 MG/100ML
INJECTION, SOLUTION INTRAVENOUS CONTINUOUS
Status: DISCONTINUED | OUTPATIENT
Start: 2023-12-08 | End: 2023-12-09 | Stop reason: HOSPADM

## 2023-12-08 RX ORDER — ONDANSETRON 4 MG/1
4 TABLET, ORALLY DISINTEGRATING ORAL EVERY 8 HOURS PRN
Status: DISCONTINUED | OUTPATIENT
Start: 2023-12-08 | End: 2023-12-14 | Stop reason: HOSPADM

## 2023-12-08 RX ORDER — MAGNESIUM SULFATE IN WATER 40 MG/ML
2000 INJECTION, SOLUTION INTRAVENOUS PRN
Status: DISCONTINUED | OUTPATIENT
Start: 2023-12-08 | End: 2023-12-14 | Stop reason: HOSPADM

## 2023-12-08 RX ORDER — SODIUM CHLORIDE 0.9 % (FLUSH) 0.9 %
5-40 SYRINGE (ML) INJECTION EVERY 12 HOURS SCHEDULED
Status: DISCONTINUED | OUTPATIENT
Start: 2023-12-08 | End: 2023-12-14 | Stop reason: HOSPADM

## 2023-12-08 RX ORDER — BENZONATATE 100 MG/1
100 CAPSULE ORAL 3 TIMES DAILY PRN
Status: DISCONTINUED | OUTPATIENT
Start: 2023-12-08 | End: 2023-12-14 | Stop reason: HOSPADM

## 2023-12-08 RX ORDER — ENOXAPARIN SODIUM 100 MG/ML
40 INJECTION SUBCUTANEOUS DAILY
Status: DISCONTINUED | OUTPATIENT
Start: 2023-12-08 | End: 2023-12-14 | Stop reason: HOSPADM

## 2023-12-08 RX ORDER — MIRTAZAPINE 7.5 MG/1
7.5 TABLET, FILM COATED ORAL NIGHTLY
Status: DISCONTINUED | OUTPATIENT
Start: 2023-12-08 | End: 2023-12-14 | Stop reason: HOSPADM

## 2023-12-08 RX ORDER — POTASSIUM CHLORIDE 7.45 MG/ML
10 INJECTION INTRAVENOUS PRN
Status: DISCONTINUED | OUTPATIENT
Start: 2023-12-08 | End: 2023-12-14 | Stop reason: HOSPADM

## 2023-12-08 RX ORDER — POLYETHYLENE GLYCOL 3350 17 G/17G
17 POWDER, FOR SOLUTION ORAL DAILY PRN
Status: DISCONTINUED | OUTPATIENT
Start: 2023-12-08 | End: 2023-12-11

## 2023-12-08 RX ORDER — ATORVASTATIN CALCIUM 10 MG/1
5 TABLET, FILM COATED ORAL NIGHTLY
Status: DISCONTINUED | OUTPATIENT
Start: 2023-12-08 | End: 2023-12-14 | Stop reason: HOSPADM

## 2023-12-08 RX ORDER — POTASSIUM CHLORIDE 20 MEQ/1
40 TABLET, EXTENDED RELEASE ORAL PRN
Status: DISCONTINUED | OUTPATIENT
Start: 2023-12-08 | End: 2023-12-14 | Stop reason: HOSPADM

## 2023-12-08 RX ADMIN — FENTANYL CITRATE 50 MCG: 50 INJECTION INTRAMUSCULAR; INTRAVENOUS at 08:08

## 2023-12-08 RX ADMIN — MIDAZOLAM HYDROCHLORIDE 1 MG: 1 INJECTION, SOLUTION INTRAMUSCULAR; INTRAVENOUS at 08:22

## 2023-12-08 RX ADMIN — FENTANYL CITRATE 50 MCG: 50 INJECTION INTRAMUSCULAR; INTRAVENOUS at 08:22

## 2023-12-08 RX ADMIN — IOPAMIDOL 80 ML: 755 INJECTION, SOLUTION INTRAVENOUS at 11:31

## 2023-12-08 RX ADMIN — AZITHROMYCIN DIHYDRATE 500 MG: 250 TABLET ORAL at 21:38

## 2023-12-08 RX ADMIN — ENOXAPARIN SODIUM 40 MG: 100 INJECTION SUBCUTANEOUS at 18:52

## 2023-12-08 RX ADMIN — SODIUM CHLORIDE: 4.5 INJECTION, SOLUTION INTRAVENOUS at 06:56

## 2023-12-08 RX ADMIN — METOPROLOL SUCCINATE 25 MG: 25 TABLET, EXTENDED RELEASE ORAL at 21:39

## 2023-12-08 RX ADMIN — HEPARIN 500 UNITS: 100 SYRINGE at 08:32

## 2023-12-08 RX ADMIN — SODIUM CHLORIDE, PRESERVATIVE FREE 10 ML: 5 INJECTION INTRAVENOUS at 21:36

## 2023-12-08 RX ADMIN — FOLIC ACID 1 MG: 1 TABLET ORAL at 21:39

## 2023-12-08 RX ADMIN — ATORVASTATIN CALCIUM 5 MG: 10 TABLET, FILM COATED ORAL at 21:39

## 2023-12-08 RX ADMIN — MIDAZOLAM HYDROCHLORIDE 1 MG: 1 INJECTION, SOLUTION INTRAMUSCULAR; INTRAVENOUS at 08:08

## 2023-12-08 RX ADMIN — MIRTAZAPINE 7.5 MG: 7.5 TABLET ORAL at 21:39

## 2023-12-08 RX ADMIN — Medication 2000 MG: at 06:57

## 2023-12-08 RX ADMIN — CEFTRIAXONE SODIUM 1000 MG: 1 INJECTION, POWDER, FOR SOLUTION INTRAMUSCULAR; INTRAVENOUS at 21:37

## 2023-12-08 ASSESSMENT — PAIN SCALES - GENERAL
PAINLEVEL_OUTOF10: 0

## 2023-12-08 ASSESSMENT — PAIN - FUNCTIONAL ASSESSMENT
PAIN_FUNCTIONAL_ASSESSMENT: NONE - DENIES PAIN
PAIN_FUNCTIONAL_ASSESSMENT: NONE - DENIES PAIN

## 2023-12-08 NOTE — H&P
Hospitalist - History & Physical      Patient: Steffen Cap    Unit/Bed:07/007A  YOB: 1955  MRN: [id number]   Acct: [de-identified]   PCP: KILLIAN Reyes - CNP    Date of Service: Pt seen/examined on 12/08/23  and Admitted to Inpatient with expected LOS greater than two midnights due to medical therapy. Chief Complaint:  SOB / RD / Hypoxia / Tachycardia     Assessment and Plan:      Acute Hypoxic RF, suspect 2/2 Malignancy / Pleural effusion: Noted hypoxia post Mediport, on 2LNC, found to have pleural effusion. Wean O2 as tolerated to maintain SpO2 >90%   Suspect pt will be off O2 once Pleural fluid is removed  IS + Acapella + position pt to maximize respiratory dynamics  Moderate, left sided Pleural effusion: CTA findings - New moderate left pleural effusion and LLL consolidation. Worsening left pleural thickening and heterogeneously enhancing consolidative lung in the lingula suspicious for pleural and parenchymal metastasis. No signs of infection at this time. 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 PET 11/1/2023 which showed FDG avid left lung mass, 8.7cm with SUV 34.2, adjacent nodules also likely malignant, left hilar LAD continues in the past, FDG avid mediastinal LAD, FDG avid nodule adjacent to pancreatic head likely malignant, lytic lesion at left lateral fourth rib highly suspicious for osseous metastatic disease.    CTA findings as mentioned below in HPI -> show what appears to be worsening disease stable. Unremarkable BMP, HsT 35, Albumin 2.2, . , WBC 16.6 (down from 18.7), H/H 8.2/28.7 (previousl 8.7/30 on 12/6), Plts 469 (12/6 - 581). COVID / Flu (-). CTA Chest demonstrates the LMPa is encased by progression of the left hilar LAD and left perihilar/ROBERTO consolidation has markedly worsened since the prior study. All of the mediastinal, hilar, and axillary lymph nodes have increased in size + More enlarged lymph nodes are noted at the thoracic inlet and in the left thyroid fossa. Some of the prevascular lymph nodes as well as lymph nodes which are noted at the thoracic inlet appear necrotic. New moderate left pleural effusion and LLL consolidation. Worsening left pleural thickening and heterogeneously enhancing consolidative lung in the lingula suspicious for pleural and parenchymal metastasis. Diffuse patchy b/l upper + lower lobe GGO noted (Edema vs infection?)    Past Medical History:  has a past medical history of Hypertension and Lung cancer ([address]). Past Surgical History:  has a past surgical history that includes Bronchoscopy flexible (10/30/2023); Endobronchial Ultrasound Ebus (10/30/2023); bronchoscopy (N/A, 10/30/2023); bronchoscopy (10/30/2023); and bronchoscopy (10/30/2023).      Home Medications:   Current Facility-Administered Medications on File Prior to Encounter   Medication Dose Route Frequency Provider Last Rate Last Admin    0.45 % sodium chloride infusion   IntraVENous Continuous Kathie Almaraz MD 20 mL/hr at 12/08/23 0656 New Bag at 12/08/23 0656    sodium chloride flush 0.9 % injection 5-40 mL  5-40 mL IntraVENous 2 times per day Nicholas Knows P, DO        sodium chloride flush 0.9 % injection 5-40 mL  5-40 mL IntraVENous PRN Nicholas Newton P, DO        0.9 % sodium chloride infusion   IntraVENous PRN Shannon Das, DO         Current Outpatient Medications on File Prior to Encounter   Medication Sig Dispense Refill    benzonatate (TESSALON) 100 MG capsule Take 1

## 2023-12-08 NOTE — ED NOTES
Patient resting in bed. Respirations easy and unlabored. No distress noted. Call light within reach.        Yousif King RN  12/08/23 1096

## 2023-12-08 NOTE — PROGRESS NOTES
1957 Pt arrived to special procedure room 2 for mediport placement under conscious sedation. Wife Rosalind Mary shown to the waiting area. 5677 Procedure explained using teach back method. Pt states understanding. 9136 Dr Rosanne Magana into assess patient and explain procedure. This procedure has been fully reviewed with the patient and written informed consent has been obtained. 1690 Patient assisted to table in supine position. Monitor attached to patient. Comfort ensured. 8861 Pre-procedure images obtained. 9120 Procedure begins. 0645 Procedure complete. Post-procedure images obtained. 1062 Monitor removed. Patient assisted to cart in semi fowlers position. Ice pack applied to right chest. Comfort ensured. 1516 Patient transported to [facility] in stable condition. Wife at bedside.

## 2023-12-08 NOTE — DISCHARGE INSTRUCTIONS
INSERTION OF MEDIPORT/POWER PORT DISCHARGE INSTRUCTION SHEET    Diet: As before  Care of catheter site:  Keep dressing clean and dry  Ice to catheter site for next 4 hours (20 minutes every hour)  Limit activity to surgical site (no pushing, pulling, or lifting) for next 2 days  Sponge bath only for next 5 days - then may shower, remove dressing and leave open to air. Steri strips will fall off on their own - do not remove  No driving today    Return to nearest Emergency Room if any of the following:  Symptoms of bleeding, drainage, swelling  Increase in pain  Elevated temperature over 101 degrees    If you have any problems call your doctor or return to the nearest Emergency Room.

## 2023-12-08 NOTE — H&P
Formulation and discussion of sedation / procedure plans, risks, benefits, side effects and alternatives with patient and/or responsible adult completed. History and Physical reviewed and unchanged.     Electronically signed by Ebony Brito MD on 12/8/23 at 7:59 AM EST
ondansetron (ZOFRAN-ODT) 8 MG TBDP disintegrating tablet Place 1 tablet under the tongue every 8 hours as needed for Nausea or Vomiting 11/30/23 12/30/23  Phi Vega MD   prochlorperazine (COMPAZINE) 10 MG tablet Take 1 tablet by mouth every 6 hours as needed (nausea) 11/30/23   Phi Vega MD   folic acid (FOLVITE) 1 MG tablet Take 1 tablet by mouth daily 11/30/23   Phi Vega MD   aspirin 325 MG tablet Take 1 tablet by mouth daily    Rm Thompson MD   atorvastatin (LIPITOR) 10 MG tablet Take 0.5 tablets by mouth daily 10/11/23   Rm Thompson MD   hydroCHLOROthiazide (HYDRODIURIL) 25 MG tablet Take 1 tablet by mouth every morning 9/19/23   Rm Thompson MD   metoprolol succinate (TOPROL XL) 25 MG extended release tablet Take 1 tablet by mouth daily 9/19/23   Rm Thompson MD   umeclidinium-vilanterol (ANORO ELLIPTA) 62.5-25 MCG/ACT inhaler Inhale 1 puff into the lungs daily 10/27/23   Chula SCHULTE DO   albuterol sulfate HFA (VENTOLIN HFA) 108 (90 Base) MCG/ACT inhaler Inhale 2 puffs into the lungs 4 times daily as needed for Wheezing 10/27/23   Klaus Alonso DO     Additional information:       VITAL SIGNS   Vitals:    12/08/23 0756   BP: (!) 127/58   Pulse: (!) 113   Resp: (!) 34   Temp:    SpO2: 92%       PHYSICAL:   Heart:  [x]Regular rate and rhythm  []Other:    Lungs:  [x]Clear    []Other:    Abdomen: [x]Soft    []Other:    Mental Status: [x]Alert & Oriented  []Other:      PLANNED PROCEDURE   []Biospy []Arteriogram              []Drainage   [x]Mediport Insertion  []Fistulogram []IV access       []Vertebroplasty / Augmentation  []IVC filter []Dialysis catheter []Biliary drainage  []Other: []CAPD Catheter []Nephrostomy Tube / Stent  SEDATION  Planned agent:[x]Midazolam []Meperidine [x]Sublimaze []Dilaudid []Morphine     []Diazepam  []Other:     ASA Classification:  []1 [x]2 []3 []4 []5  Class 1: A normal healthy patient  Class 2: Pt with mild to

## 2023-12-08 NOTE — ED NOTES
Pt transported to [address] [place] [address] in stable condition. Floor contacted before transport,spoke to floor staff.       Francis Wild  12/08/23 7255

## 2023-12-08 NOTE — ED NOTES
Bedside report taken from Poli RN, this nurse assuming care  Pt and family at bedside updated on poc, no new needs at this time  Dr Kline Said at bedside      Rick Bailey [place]  12/08/23 5560

## 2023-12-08 NOTE — OP NOTE
Department of Radiology  Post Procedure Progress Note      Pre-Procedure Diagnosis:  Lung cancer     Procedure Performed:  mediport insertion     Anesthesia: local / versed and fentanyl    Findings: successful    Immediate Complications:  None    Estimated Blood Loss: minimal    SEE DICTATED PROCEDURE NOTE FOR COMPLETE DETAILS.     Alfonso Magaña MD   12/8/2023 8:41 AM

## 2023-12-08 NOTE — ED TRIAGE NOTES
Patient presents to ED from outpatient nursing with c/o low pulse ox after Mediport placement. Patient denies any chest pain or SOB. EKG completed in triage. Upon arrival patient was 88% on room air. Placed on 2L O2 via nasal cannula and oxygen saturation now 94%. Call light in reach. Wife at bedside.

## 2023-12-08 NOTE — PROGRESS NOTES
Patient was admitted to [facility] for Mediport placement. During admission the patients oxygen saturation was 86-91% with deep breaths. During the procedure the patient was given oxygen at 2L NC. Post procedure the patients oxygen saturation will not get above 87% on room air. Dr Terri Fulton was called and ordered the patient to go to the ER for evaluation. Dr Terri Fulton office stated he saw the patient yesterday and this is a new finding.

## 2023-12-08 NOTE — ED NOTES
Pt and family updated on poc and room assignment, no new complaints, pt given urinal at bedside  Patient resting in bed. Respirations easy and unlabored. No distress noted. Call light within reach.        Howie Hernandez RN  12/08/23 8641

## 2023-12-08 NOTE — ED NOTES
Patient returned from [address] [facility] with this RN.  Vitals as charted      Klaudia Antoine RN  12/08/23 1141

## 2023-12-08 NOTE — PROGRESS NOTES
__m__ Safety:       (Environmental)  [place] to environment  Ensure ID band is correct and in place/ allergy band as needed  Assess for fall risk  Initiate fall precautions as applicable (fall band, side rails, etc.)  Call light within reach  Bed in low position/ wheels locked    _m___ Pain:       Assess pain level and characteristics  Administer analgesics as ordered  Assess effectiveness of pain management and report to MD as needed    __m__ Knowledge Deficit:  Assess baseline knowledge  Provide teaching at level of understanding  Provide teaching via preferred learning method  Evaluate teaching effectiveness    _m___ Hemodynamic/Respiratory Status:       (Pre and Post Procedure Monitoring)  Assess/Monitor vital signs and LOC  Assess Baseline SpO2 prior to any sedation  Obtain weight/height  Assess vital signs/ LOC until patient meets discharge criteria  Monitor procedure site and notify MD of any issues

## 2023-12-08 NOTE — PROGRESS NOTES
Patient admitted to room 01 for Mediport placement, family at bedside. Patient offered rights and responsibilities.

## 2023-12-08 NOTE — ED NOTES
Updated patient that CT is back and MD will be in to discuss findings.       Domingo Maciel, RN  12/08/23 8121

## 2023-12-08 NOTE — ED NOTES
Bedside report completed with Elizabeth Penny RN. Wife at bedside. Updated patient and wife on plan of care. Call light in reach. No concerns voiced.       Flip Avila RN  12/08/23 3077

## 2023-12-09 ENCOUNTER — APPOINTMENT (OUTPATIENT)
Dept: GENERAL RADIOLOGY | Age: 68
End: 2023-12-09
Payer: MEDICARE

## 2023-12-09 ENCOUNTER — APPOINTMENT (OUTPATIENT)
Dept: ULTRASOUND IMAGING | Age: 68
End: 2023-12-09
Payer: MEDICARE

## 2023-12-09 LAB
ALBUMIN SERPL BCG-MCNC: 1.8 G/DL (ref 3.5–5.1)
ALP SERPL-CCNC: 159 U/L (ref 38–126)
ALT SERPL W/O P-5'-P-CCNC: 6 U/L (ref 11–66)
ANION GAP SERPL CALC-SCNC: 13 MEQ/L (ref 8–16)
AST SERPL-CCNC: 14 U/L (ref 5–40)
BASOPHILS ABSOLUTE: 0 THOU/MM3 (ref 0–0.1)
BASOPHILS NFR BLD AUTO: 0.2 %
BILIRUB CONJ SERPL-MCNC: < 0.2 MG/DL (ref 0–0.3)
BILIRUB SERPL-MCNC: 0.3 MG/DL (ref 0.3–1.2)
BUN SERPL-MCNC: 17 MG/DL (ref 7–22)
CALCIUM SERPL-MCNC: 10.2 MG/DL (ref 8.5–10.5)
CHLORIDE SERPL-SCNC: 97 MEQ/L (ref 98–111)
CO2 SERPL-SCNC: 28 MEQ/L (ref 23–33)
CREAT SERPL-MCNC: 0.5 MG/DL (ref 0.4–1.2)
DEPRECATED RDW RBC AUTO: 57.5 FL (ref 35–45)
EKG ATRIAL RATE: 106 BPM
EKG ATRIAL RATE: 116 BPM
EKG ATRIAL RATE: 99 BPM
EKG P AXIS: 34 DEGREES
EKG P AXIS: 36 DEGREES
EKG P AXIS: 4 DEGREES
EKG P-R INTERVAL: 128 MS
EKG P-R INTERVAL: 128 MS
EKG P-R INTERVAL: 96 MS
EKG Q-T INTERVAL: 306 MS
EKG Q-T INTERVAL: 318 MS
EKG Q-T INTERVAL: 320 MS
EKG QRS DURATION: 84 MS
EKG QRS DURATION: 92 MS
EKG QRS DURATION: 94 MS
EKG QTC CALCULATION (BAZETT): 408 MS
EKG QTC CALCULATION (BAZETT): 425 MS
EKG QTC CALCULATION (BAZETT): 425 MS
EKG R AXIS: 31 DEGREES
EKG R AXIS: 32 DEGREES
EKG R AXIS: 33 DEGREES
EKG T AXIS: 20 DEGREES
EKG T AXIS: 24 DEGREES
EKG T AXIS: 8 DEGREES
EKG VENTRICULAR RATE: 106 BPM
EKG VENTRICULAR RATE: 116 BPM
EKG VENTRICULAR RATE: 99 BPM
EOSINOPHIL NFR BLD AUTO: 0.2 %
EOSINOPHILS ABSOLUTE: 0 THOU/MM3 (ref 0–0.4)
ERYTHROCYTE [DISTWIDTH] IN BLOOD BY AUTOMATED COUNT: 19.4 % (ref 11.5–14.5)
GFR SERPL CREATININE-BSD FRML MDRD: > 60 ML/MIN/1.73M2
GLUCOSE FLD-MCNC: 125 MG/DL
GLUCOSE SERPL-MCNC: 83 MG/DL (ref 70–108)
HCT VFR BLD AUTO: 26.5 % (ref 42–52)
HGB BLD-MCNC: 7.5 GM/DL (ref 14–18)
HYPOCHROMIA BLD QL SMEAR: PRESENT
IMM GRANULOCYTES # BLD AUTO: 0.16 THOU/MM3 (ref 0–0.07)
IMM GRANULOCYTES NFR BLD AUTO: 0.9 %
LDH FLD L TO P-CCNC: 107 U/L
LYMPHOCYTES ABSOLUTE: 1.5 THOU/MM3 (ref 1–4.8)
LYMPHOCYTES NFR BLD AUTO: 8.3 %
MCH RBC QN AUTO: 23 PG (ref 26–33)
MCHC RBC AUTO-ENTMCNC: 28.3 GM/DL (ref 32.2–35.5)
MCV RBC AUTO: 81.3 FL (ref 80–94)
MONOCYTES ABSOLUTE: 1.4 THOU/MM3 (ref 0.4–1.3)
MONOCYTES NFR BLD AUTO: 7.7 %
NEUTROPHILS NFR BLD AUTO: 82.7 %
NRBC BLD AUTO-RTO: 0 /100 WBC
PH BIFL: 7.71 [PH]
PLATELET # BLD AUTO: 499 THOU/MM3 (ref 130–400)
PMV BLD AUTO: 8.9 FL (ref 9.4–12.4)
POTASSIUM SERPL-SCNC: 4.1 MEQ/L (ref 3.5–5.2)
PROT FLD-MCNC: 3.7 GM/DL
PROT SERPL-MCNC: 6.6 G/DL (ref 6.1–8)
RBC # BLD AUTO: 3.26 MILL/MM3 (ref 4.7–6.1)
SCAN OF BLOOD SMEAR: NORMAL
SEGMENTED NEUTROPHILS ABSOLUTE COUNT: 15.2 THOU/MM3 (ref 1.8–7.7)
SODIUM SERPL-SCNC: 138 MEQ/L (ref 135–145)
STOMATOCYTES: ABNORMAL
WBC # BLD AUTO: 18.4 THOU/MM3 (ref 4.8–10.8)

## 2023-12-09 PROCEDURE — 88342 IMHCHEM/IMCYTCHM 1ST ANTB: CPT

## 2023-12-09 PROCEDURE — 93010 ELECTROCARDIOGRAM REPORT: CPT | Performed by: INTERNAL MEDICINE

## 2023-12-09 PROCEDURE — 36415 COLL VENOUS BLD VENIPUNCTURE: CPT

## 2023-12-09 PROCEDURE — 94640 AIRWAY INHALATION TREATMENT: CPT

## 2023-12-09 PROCEDURE — 88108 CYTOPATH CONCENTRATE TECH: CPT

## 2023-12-09 PROCEDURE — 80048 BASIC METABOLIC PNL TOTAL CA: CPT

## 2023-12-09 PROCEDURE — 87075 CULTR BACTERIA EXCEPT BLOOD: CPT

## 2023-12-09 PROCEDURE — 82945 GLUCOSE OTHER FLUID: CPT

## 2023-12-09 PROCEDURE — 94669 MECHANICAL CHEST WALL OSCILL: CPT

## 2023-12-09 PROCEDURE — 0W9G3ZZ DRAINAGE OF PERITONEAL CAVITY, PERCUTANEOUS APPROACH: ICD-10-PCS | Performed by: RADIOLOGY

## 2023-12-09 PROCEDURE — 0W9B3ZZ DRAINAGE OF LEFT PLEURAL CAVITY, PERCUTANEOUS APPROACH: ICD-10-PCS | Performed by: RADIOLOGY

## 2023-12-09 PROCEDURE — 83615 LACTATE (LD) (LDH) ENZYME: CPT

## 2023-12-09 PROCEDURE — 2580000003 HC RX 258: Performed by: STUDENT IN AN ORGANIZED HEALTH CARE EDUCATION/TRAINING PROGRAM

## 2023-12-09 PROCEDURE — 84157 ASSAY OF PROTEIN OTHER: CPT

## 2023-12-09 PROCEDURE — 6370000000 HC RX 637 (ALT 250 FOR IP): Performed by: INTERNAL MEDICINE

## 2023-12-09 PROCEDURE — 71045 X-RAY EXAM CHEST 1 VIEW: CPT

## 2023-12-09 PROCEDURE — 83986 ASSAY PH BODY FLUID NOS: CPT

## 2023-12-09 PROCEDURE — 2580000003 HC RX 258: Performed by: INTERNAL MEDICINE

## 2023-12-09 PROCEDURE — 94760 N-INVAS EAR/PLS OXIMETRY 1: CPT

## 2023-12-09 PROCEDURE — 1200000003 HC TELEMETRY R&B

## 2023-12-09 PROCEDURE — 89050 BODY FLUID CELL COUNT: CPT

## 2023-12-09 PROCEDURE — 99233 SBSQ HOSP IP/OBS HIGH 50: CPT | Performed by: INTERNAL MEDICINE

## 2023-12-09 PROCEDURE — 32555 ASPIRATE PLEURA W/ IMAGING: CPT

## 2023-12-09 PROCEDURE — 6370000000 HC RX 637 (ALT 250 FOR IP): Performed by: STUDENT IN AN ORGANIZED HEALTH CARE EDUCATION/TRAINING PROGRAM

## 2023-12-09 PROCEDURE — 85025 COMPLETE CBC W/AUTO DIFF WBC: CPT

## 2023-12-09 PROCEDURE — 88341 IMHCHEM/IMCYTCHM EA ADD ANTB: CPT

## 2023-12-09 PROCEDURE — 2700000000 HC OXYGEN THERAPY PER DAY

## 2023-12-09 PROCEDURE — 88305 TISSUE EXAM BY PATHOLOGIST: CPT

## 2023-12-09 PROCEDURE — 88112 CYTOPATH CELL ENHANCE TECH: CPT

## 2023-12-09 PROCEDURE — 87070 CULTURE OTHR SPECIMN AEROBIC: CPT

## 2023-12-09 PROCEDURE — 6360000002 HC RX W HCPCS: Performed by: INTERNAL MEDICINE

## 2023-12-09 PROCEDURE — 80076 HEPATIC FUNCTION PANEL: CPT

## 2023-12-09 RX ADMIN — FOLIC ACID 1 MG: 1 TABLET ORAL at 07:56

## 2023-12-09 RX ADMIN — METOPROLOL SUCCINATE 25 MG: 25 TABLET, EXTENDED RELEASE ORAL at 07:56

## 2023-12-09 RX ADMIN — ATORVASTATIN CALCIUM 5 MG: 10 TABLET, FILM COATED ORAL at 19:47

## 2023-12-09 RX ADMIN — SODIUM CHLORIDE, PRESERVATIVE FREE 10 ML: 5 INJECTION INTRAVENOUS at 07:56

## 2023-12-09 RX ADMIN — MIRTAZAPINE 7.5 MG: 7.5 TABLET ORAL at 19:47

## 2023-12-09 RX ADMIN — SODIUM CHLORIDE, PRESERVATIVE FREE 10 ML: 5 INJECTION INTRAVENOUS at 21:43

## 2023-12-09 RX ADMIN — CEFTRIAXONE SODIUM 1000 MG: 1 INJECTION, POWDER, FOR SOLUTION INTRAMUSCULAR; INTRAVENOUS at 21:43

## 2023-12-09 RX ADMIN — AZITHROMYCIN DIHYDRATE 500 MG: 250 TABLET ORAL at 19:47

## 2023-12-09 RX ADMIN — TIOTROPIUM BROMIDE AND OLODATEROL 2 PUFF: 3.124; 2.736 SPRAY, METERED RESPIRATORY (INHALATION) at 08:53

## 2023-12-09 NOTE — PROCEDURES
Pre-Procedure Diagnosis: Ascites    Procedure Performed:  Paracentesis    Anesthesia: local    Findings: Clear yellow serous fluid obtained. Immediate Complications:  None    Estimated Blood Loss: minimal    SEE DICTATED PROCEDURE NOTE FOR COMPLETE DETAILS.     Dominic Clark MD   12/9/2023 2:09 PM

## 2023-12-09 NOTE — ED PROVIDER NOTES
ED Attending Physician Attestation     I performed a history and physical examination of the patient and discussed management with the Resident. I reviewed the Resident's note and agree with the documented findings and plan of care. Any areas of disagreement are noted on the chart. I was personally present for the key portions of any procedures and have documented in the chart those procedures where I was not present during the key portions. I have reviewed the emergency nurses triage note and agree with the chief complaint, past medical history, past surgical history, allergies, medications, social and family history as documented unless otherwise noted below. Acute hypoxia, just had a Mediport placed, apparently noticed to be hypoxic just prior to the Mediport placement, rather than there after. Not in distress, oxygen saturation improves with supplemental oxygen. Imaging no evidence of pneumo, consider left parapneumonic effusion.   Plan to admit, will likely need a left thoracentesis for further diagnosis    Shantelle Bello DO, [facility]  Attending Emergency Physician       Shantelle Bello DO  12/09/23 9631

## 2023-12-09 NOTE — PLAN OF CARE
Problem: Respiratory - Adult  Goal: Clear lung sounds  Description: Clear lung sounds  Outcome: Progressing    Continue tx's to improve breath sounds, increas aeration and decrease WOB.

## 2023-12-09 NOTE — PLAN OF CARE
Problem: Discharge Planning  Goal: Discharge to home or other facility with appropriate resources  Outcome: Progressing  Flowsheets (Taken 12/9/2023 1249)  Discharge to home or other facility with appropriate resources:   Identify barriers to discharge with patient and caregiver   Arrange for needed discharge resources and transportation as appropriate   Identify discharge learning needs (meds, wound care, etc)   Arrange for interpreters to assist at discharge as needed   Refer to discharge planning if patient needs post-hospital services based on physician order or complex needs related to functional status, cognitive ability or social support system     Problem: Respiratory - Adult  Goal: Achieves optimal ventilation and oxygenation  Outcome: Progressing  Flowsheets (Taken 12/9/2023 1249)  Achieves optimal ventilation and oxygenation:   Assess for changes in respiratory status   Assess for changes in mentation and behavior   Position to facilitate oxygenation and minimize respiratory effort     Problem: Cardiovascular - Adult  Goal: Maintains optimal cardiac output and hemodynamic stability  Outcome: Progressing  Flowsheets (Taken 12/9/2023 1249)  Maintains optimal cardiac output and hemodynamic stability: Monitor blood pressure and heart rate     Problem: Cardiovascular - Adult  Goal: Absence of cardiac dysrhythmias or at baseline  Outcome: Progressing  Flowsheets (Taken 12/9/2023 1249)  Absence of cardiac dysrhythmias or at baseline:   Monitor cardiac rate and rhythm   Assess for signs of decreased cardiac output     Problem: Safety - Adult  Goal: Free from fall injury  Outcome: Progressing  Flowsheets (Taken 12/9/2023 1249)  Free From Fall Injury:   Instruct family/caregiver on patient safety   Based on caregiver fall risk screen, instruct family/caregiver to ask for assistance with transferring infant if caregiver noted to have fall risk factors   Care plan reviewed with patient and family.   Patient and

## 2023-12-09 NOTE — CONSENT
Formulation and discussion of sedation / procedure plans, risks, benefits, side effects and alternatives with patient and/or responsible adult completed. History and Physical reviewed and unchanged.     Electronically signed by Fuentes Oreilly MD on 12/9/23 at 2:09 PM EST

## 2023-12-09 NOTE — PROGRESS NOTES
BILIDIR  --  <0.2   BILITOT 0.4 0.3   ALKPHOS 163* 159*     No results for input(s): \"INR\" in the last 72 hours. No results for input(s): \"CKTOTAL\", \"TROPONINI\" in the last 72 hours. Microbiology:    Blood culture #1: No results found for: \"BC\"    Blood culture #2:No results found for: \"BLOODCULT2\"    Organism:No results found for: \"ORG\"      Lab Results   Component Value Date/Time    LABGRAM  12/09/2023 02:10 PM     Moderate segmented neutrophils observed. No bacteria seen. performed on cytospun specimen       MRSA culture only:No results found for: \"MRSAC\"    Urine culture: No results found for: \"LABURIN\"    Respiratory culture: No results found for: \"CULTRESP\"    Aerobic and Anaerobic :  No results found for: \"LABAERO\"  No results found for: \"LABANAE\"    Urinalysis:    No results found for: \"NITRU\", \"WBCUA\", \"BACTERIA\", \"RBCUA\", \"BLOODU\", \"SPECGRAV\", \"GLUCOSEU\"    Radiology:    XR CHEST PORTABLE   Final Result   1. Normal heart size. No pleural effusion seen. No evidence for pneumothorax following recent thoracentesis left side. 2. MediPort right side, catheter tip in right atrium. 3. Relatively diffuse infiltrative process in the left lung which has worsened since prior study, consistent with the known infiltrative left lung mass and probably some adjacent atelectasis/pneumonia. Overall appearance is worsened since prior study. **This report has been created using voice recognition software. It may contain minor errors which are inherent in voice recognition technology. **      Final report electronically signed by Dr. Kathie Almaraz on 12/9/2023 2:37 PM      US THORACENTESIS Which side should the procedure be performed? Left   Final Result   Status post thoracentesis            **This report has been created using voice recognition software. It may contain minor errors which are inherent in voice recognition technology. **      Final report electronically signed by Dr. Kathie Almaraz on sagittal planes. All CT scans at this facility use dose modulation, iterative reconstruction, and/or weight based dosing when appropriate to reduce the radiation dose to as low as reasonably achievable. CONTRAST: 80 cc Isovue-370. FINDINGS: Pulmonary arteries: No filling defects are noted within the pulmonary arterial vasculature to suggest the presence of pulmonary embolus however the left main pulmonary artery is encased by the left hilar lymphadenopathy and the left lung consolidation which has progressed since the prior CT exams. Heart/mediastinum: A prevascular necrotic appearing lymph node measures up to 23 mm (series 4, image 82). This lymph node previously measured 14 mm). Left hilar lymph nodes measure at least up to 3 cm (previously measured 2 cm). AP window lymphadenopathy  measures up to 3 cm (missing measured up to 2 cm). Paratracheal lymph nodes measure up to 19 mm (previously measured up to 15 mm). Enlarged axillary lymph nodes measure up to 13 mm in previously measured 9 mm). A right chest Port-A-Cath appears intact. The heart size is normal. Coronary artery calcifications are observed. No pericardial effusion is identified. Lungs: An enlarged lymph node in the left thyroid fossa measures 21 mm (series 4, image 47) with central foci of air density suggesting necrosis. Several new lymph nodes are noted at the thoracic inlet measuring up to 2 cm. The left perihilar consolidation extending from the left hilum into the left upper lobe has significantly progressed. Moderate left lower lobe consolidation and moderate left pleural effusion are now visualized respiratory motion limits visualization of the lung parenchyma. Patchy bilateral upper and lower lobe groundglass opacities are observed. No pneumothorax is identified. Upper abdomen: The limited images through the upper abdomen demonstrate hepatomegaly and hepatic steatosis. The gallbladder is distended, incompletely visualized.  Musculoskeletal: fluoroscopic spot film of the chest was obtained to confirm appropriate MediPort and catheter position. Dermabond skin adhesive and Steri-Strips were applied to the wound sites along with a sterile OpSite dressing. Status post successful MediPort insertion. **This report has been created using voice recognition software. It may contain minor errors which are inherent in voice recognition technology. ** Final report electronically signed by Dr. Darcy Macias on 12/8/2023 9:02 AM      Electronically signed by Cassy Magallanes MD on 12/9/2023 at 6:19 PM

## 2023-12-09 NOTE — PLAN OF CARE
Problem: Discharge Planning  Goal: Discharge to home or other facility with appropriate resources  Outcome: Progressing  Flowsheets  Taken 12/8/2023 1841 by Tuan Khan RN  Discharge to home or other facility with appropriate resources: Identify barriers to discharge with patient and caregiver  Taken 12/8/2023 1720 by Tuan Khan RN  Discharge to home or other facility with appropriate resources: Identify barriers to discharge with patient and caregiver     Problem: Respiratory - Adult  Goal: Achieves optimal ventilation and oxygenation  Outcome: Progressing     Problem: Cardiovascular - Adult  Goal: Maintains optimal cardiac output and hemodynamic stability  Outcome: Progressing  Flowsheets  Taken 12/8/2023 1947 by Gay Cox RN  Maintains optimal cardiac output and hemodynamic stability: Monitor blood pressure and heart rate  Taken 12/8/2023 1720 by Tuan Khan RN  Maintains optimal cardiac output and hemodynamic stability: Monitor blood pressure and heart rate  Goal: Absence of cardiac dysrhythmias or at baseline  Outcome: Progressing  Flowsheets  Taken 12/8/2023 1947 by Gay Cox RN  Absence of cardiac dysrhythmias or at baseline: Monitor cardiac rate and rhythm  Taken 12/8/2023 1720 by Tuan Khan RN  Absence of cardiac dysrhythmias or at baseline: Monitor cardiac rate and rhythm     Problem: Safety - Adult  Goal: Free from fall injury  Outcome: Progressing

## 2023-12-10 LAB
ARTERIAL PATENCY WRIST A: POSITIVE
BASE EXCESS BLDA CALC-SCNC: 8.4 MMOL/L (ref -2.5–2.5)
BASOPHILS ABSOLUTE: 0 THOU/MM3 (ref 0–0.1)
BASOPHILS NFR BLD AUTO: 0.2 %
BDY SITE: ABNORMAL
CHARACTER, BODY FLUID: NORMAL
COLLECTED BY:: ABNORMAL
COLOR FLD: NORMAL
DEPRECATED RDW RBC AUTO: 59.2 FL (ref 35–45)
DEVICE: ABNORMAL
EOSINOPHIL NFR BLD AUTO: 0.1 %
EOSINOPHILS ABSOLUTE: 0 THOU/MM3 (ref 0–0.4)
ERYTHROCYTE [DISTWIDTH] IN BLOOD BY AUTOMATED COUNT: 19.5 % (ref 11.5–14.5)
FIO2 ON VENT O2 ANALYZER: 28 %
GRANULOCYTES NFR FLD AUTO: 50 %
HCO3 BLDA-SCNC: 34 MMOL/L (ref 23–28)
HCT VFR BLD AUTO: 26 % (ref 42–52)
HGB BLD-MCNC: 7.3 GM/DL (ref 14–18)
HYPOCHROMIA BLD QL SMEAR: PRESENT
IMM GRANULOCYTES # BLD AUTO: 0.18 THOU/MM3 (ref 0–0.07)
IMM GRANULOCYTES NFR BLD AUTO: 0.9 %
LYMPHOCYTES ABSOLUTE: 1.4 THOU/MM3 (ref 1–4.8)
LYMPHOCYTES NFR BLD AUTO: 7 %
MCH RBC QN AUTO: 23.3 PG (ref 26–33)
MCHC RBC AUTO-ENTMCNC: 28.1 GM/DL (ref 32.2–35.5)
MCV RBC AUTO: 83.1 FL (ref 80–94)
MONOCYTES ABSOLUTE: 1.5 THOU/MM3 (ref 0.4–1.3)
MONOCYTES NFR BLD AUTO: 7.5 %
MONONUC CELLS NFR FLD AUTO: 50 %
NEUTROPHILS NFR BLD AUTO: 84.3 %
NRBC BLD AUTO-RTO: 0 /100 WBC
NUC CELL # FLD AUTO: 5 /CUMM (ref 0–500)
PATHOLOGIST REVIEW: NORMAL
PCO2 BLDA: 51 MMHG (ref 35–45)
PH BLDA: 7.44 [PH] (ref 7.35–7.45)
PLATELET # BLD AUTO: 459 THOU/MM3 (ref 130–400)
PMV BLD AUTO: 8.9 FL (ref 9.4–12.4)
PO2 BLDA: 74 MMHG (ref 71–104)
RBC # BLD AUTO: 3.13 MILL/MM3 (ref 4.7–6.1)
RBC # FLD AUTO: < 2000 /CUMM
SAO2 % BLDA: 95 %
SEGMENTED NEUTROPHILS ABSOLUTE COUNT: 16.5 THOU/MM3 (ref 1.8–7.7)
SPECIMEN: NORMAL
TOTAL VOLUME RECEIVED BODY FLUID: 73 ML
WBC # BLD AUTO: 19.6 THOU/MM3 (ref 4.8–10.8)

## 2023-12-10 PROCEDURE — 6360000002 HC RX W HCPCS: Performed by: INTERNAL MEDICINE

## 2023-12-10 PROCEDURE — 1200000003 HC TELEMETRY R&B

## 2023-12-10 PROCEDURE — 94760 N-INVAS EAR/PLS OXIMETRY 1: CPT

## 2023-12-10 PROCEDURE — P9047 ALBUMIN (HUMAN), 25%, 50ML: HCPCS | Performed by: INTERNAL MEDICINE

## 2023-12-10 PROCEDURE — 6370000000 HC RX 637 (ALT 250 FOR IP): Performed by: INTERNAL MEDICINE

## 2023-12-10 PROCEDURE — 36415 COLL VENOUS BLD VENIPUNCTURE: CPT

## 2023-12-10 PROCEDURE — 6370000000 HC RX 637 (ALT 250 FOR IP): Performed by: STUDENT IN AN ORGANIZED HEALTH CARE EDUCATION/TRAINING PROGRAM

## 2023-12-10 PROCEDURE — 94669 MECHANICAL CHEST WALL OSCILL: CPT

## 2023-12-10 PROCEDURE — 99222 1ST HOSP IP/OBS MODERATE 55: CPT | Performed by: PHYSICIAN ASSISTANT

## 2023-12-10 PROCEDURE — 2580000003 HC RX 258: Performed by: INTERNAL MEDICINE

## 2023-12-10 PROCEDURE — 6360000002 HC RX W HCPCS: Performed by: STUDENT IN AN ORGANIZED HEALTH CARE EDUCATION/TRAINING PROGRAM

## 2023-12-10 PROCEDURE — 2580000003 HC RX 258: Performed by: STUDENT IN AN ORGANIZED HEALTH CARE EDUCATION/TRAINING PROGRAM

## 2023-12-10 PROCEDURE — 85025 COMPLETE CBC W/AUTO DIFF WBC: CPT

## 2023-12-10 PROCEDURE — 2700000000 HC OXYGEN THERAPY PER DAY

## 2023-12-10 PROCEDURE — 82803 BLOOD GASES ANY COMBINATION: CPT

## 2023-12-10 PROCEDURE — 36600 WITHDRAWAL OF ARTERIAL BLOOD: CPT

## 2023-12-10 PROCEDURE — 99232 SBSQ HOSP IP/OBS MODERATE 35: CPT | Performed by: INTERNAL MEDICINE

## 2023-12-10 PROCEDURE — 94640 AIRWAY INHALATION TREATMENT: CPT

## 2023-12-10 RX ORDER — ALBUMIN (HUMAN) 12.5 G/50ML
25 SOLUTION INTRAVENOUS ONCE
Status: COMPLETED | OUTPATIENT
Start: 2023-12-10 | End: 2023-12-10

## 2023-12-10 RX ADMIN — FOLIC ACID 1 MG: 1 TABLET ORAL at 09:59

## 2023-12-10 RX ADMIN — ENOXAPARIN SODIUM 40 MG: 100 INJECTION SUBCUTANEOUS at 09:58

## 2023-12-10 RX ADMIN — SODIUM CHLORIDE, PRESERVATIVE FREE 10 ML: 5 INJECTION INTRAVENOUS at 21:42

## 2023-12-10 RX ADMIN — ACETAMINOPHEN 650 MG: 325 TABLET ORAL at 21:43

## 2023-12-10 RX ADMIN — MIRTAZAPINE 7.5 MG: 7.5 TABLET ORAL at 21:43

## 2023-12-10 RX ADMIN — SODIUM CHLORIDE, PRESERVATIVE FREE 10 ML: 5 INJECTION INTRAVENOUS at 09:59

## 2023-12-10 RX ADMIN — AZITHROMYCIN DIHYDRATE 500 MG: 250 TABLET ORAL at 21:43

## 2023-12-10 RX ADMIN — METOPROLOL SUCCINATE 25 MG: 25 TABLET, EXTENDED RELEASE ORAL at 09:59

## 2023-12-10 RX ADMIN — TIOTROPIUM BROMIDE AND OLODATEROL 2 PUFF: 3.124; 2.736 SPRAY, METERED RESPIRATORY (INHALATION) at 06:55

## 2023-12-10 RX ADMIN — ALBUMIN (HUMAN) 25 G: 0.25 INJECTION, SOLUTION INTRAVENOUS at 16:22

## 2023-12-10 RX ADMIN — CEFTRIAXONE SODIUM 1000 MG: 1 INJECTION, POWDER, FOR SOLUTION INTRAMUSCULAR; INTRAVENOUS at 21:42

## 2023-12-10 RX ADMIN — ATORVASTATIN CALCIUM 5 MG: 10 TABLET, FILM COATED ORAL at 21:43

## 2023-12-10 NOTE — PLAN OF CARE
Problem: Discharge Planning  Goal: Discharge to home or other facility with appropriate resources  12/9/2023 2201 by Nan Finch RN  Outcome: Progressing  12/9/2023 1249 by Jame Fortune RN  Outcome: Progressing  Flowsheets (Taken 12/9/2023 1249)  Discharge to home or other facility with appropriate resources:   Identify barriers to discharge with patient and caregiver   Arrange for needed discharge resources and transportation as appropriate   Identify discharge learning needs (meds, wound care, etc)   Arrange for interpreters to assist at discharge as needed   Refer to discharge planning if patient needs post-hospital services based on physician order or complex needs related to functional status, cognitive ability or social support system     Problem: Respiratory - Adult  Goal: Achieves optimal ventilation and oxygenation  12/9/2023 2201 by Nan Finch RN  Outcome: Progressing  12/9/2023 1249 by Jame Fortune RN  Outcome: Progressing  Flowsheets (Taken 12/9/2023 1249)  Achieves optimal ventilation and oxygenation:   Assess for changes in respiratory status   Assess for changes in mentation and behavior   Position to facilitate oxygenation and minimize respiratory effort  Goal: Clear lung sounds  Description: Clear lung sounds  12/9/2023 0900 by Kell Braxton, ERIS  Outcome: Progressing     Problem: Cardiovascular - Adult  Goal: Maintains optimal cardiac output and hemodynamic stability  12/9/2023 2201 by Nan Finch RN  Outcome: Progressing  12/9/2023 1249 by Jame Fortune RN  Outcome: Progressing  Flowsheets (Taken 12/9/2023 1249)  Maintains optimal cardiac output and hemodynamic stability: Monitor blood pressure and heart rate  Goal: Absence of cardiac dysrhythmias or at baseline  12/9/2023 2201 by Nan Finch RN  Outcome: Progressing  12/9/2023 1249 by Jame Fortune RN  Outcome: Progressing  Flowsheets (Taken 12/9/2023 1249)  Absence of cardiac dysrhythmias or at baseline: Monitor cardiac rate and rhythm   Assess for signs of decreased cardiac output     Problem: Safety - Adult  Goal: Free from fall injury  12/9/2023 2201 by Adan Mei RN  Outcome: Progressing  12/9/2023 1249 by Conrad Guevara RN  Outcome: Progressing  Flowsheets (Taken 12/9/2023 1249)  Free From Fall Injury:   Cesar Ryan family/caregiver on patient safety   Based on caregiver fall risk screen, instruct family/caregiver to ask for assistance with transferring infant if caregiver noted to have fall risk factors

## 2023-12-10 NOTE — PROGRESS NOTES
Hospitalist Progress Note      Patient:  Michelle Diamond    Unit/Bed:8A-03/003-A  YOB: 1955  MRN: [id number]   Acct: [de-identified]   PCP: KILLIAN Eckert CNP  Date of Admission: 12/8/2023    Assessment/Plan:    Acute hypoxic respiratory failure        Likely due to the pleural effusion s/p thoracentesis today left side        And number #2.,  And possible pneumonia-on IV antibiotics  2. History of metastic adeno cancer of the lung  3. Essential hypertension on Toprol-XL 25 daily  4. Possible COPD ex tobacco usage  5. Anemia of chronic disease  6. Dyslipidemia on Lipitor 10 mg daily      Patient underwent thoracentesis today on the left side by IR team, and was being considered for discharge the patient has it was stable on 1 L but when we checked the O2 requirements he became hypoxic on room air 78%. Discharge was canceled and consulted pulmonary team, they evaluated him today . Chief Complaint: SOB    Initial H and P:-      Michelle Diamond is a 76 yo M w/significant PMHx of HTN, HLD, Chronic Anemia (ACD), Former tobacco smoker (32PY Hx, quit 2004), COPD (Emphysema - noted on imaging, formal PFT not completed yet), and recently Dx Metastatic Lung AdenoCA who presents to [facility] ED from [facility] for evaluation of worsening SOB / Hypoxia / Tachycardia s/p Mediport placement. required 2LNC during procedure -> Post-pocedure, pt went to recovery, noted to have SpO2 86-91% w/Deep breaths -> could not keep >87% on RA so Oncology office was called, stated this was new and fo pt to go to ED for evaluation. On Arrival, Afebrile, R 17 w/SpO2 88% on RA -> RR 26-36 on 2LNC w/SpO2 >93%, -113, BP stable. Unremarkable BMP, HsT 35, Albumin 2.2, . , WBC 16.6 (down from 18.7), H/H 8.2/28.7 (previousl 8.7/30 on 12/6), Plts 469 (12/6 - 581). COVID / Flu (-).  CTA Chest demonstrates the LMPa is encased by 12/08/23  1111 12/09/23  0526   AST 15 14   ALT 9* 6*   BILIDIR  --  <0.2   BILITOT 0.4 0.3   ALKPHOS 163* 159*     No results for input(s): \"INR\" in the last 72 hours. No results for input(s): \"CKTOTAL\", \"TROPONINI\" in the last 72 hours. Microbiology:    Blood culture #1: No results found for: \"BC\"    Blood culture #2:No results found for: \"BLOODCULT2\"    Organism:No results found for: \"ORG\"      Lab Results   Component Value Date/Time    LABGRAM  12/09/2023 02:10 PM     Moderate segmented neutrophils observed. No bacteria seen. performed on cytospun specimen       MRSA culture only:No results found for: \"MRSAC\"    Urine culture: No results found for: \"LABURIN\"    Respiratory culture: No results found for: \"CULTRESP\"    Aerobic and Anaerobic :  No results found for: \"LABAERO\"  No results found for: \"LABANAE\"    Urinalysis:    No results found for: \"NITRU\", \"WBCUA\", \"BACTERIA\", \"RBCUA\", \"BLOODU\", \"SPECGRAV\", \"GLUCOSEU\"    Radiology:    XR CHEST PORTABLE   Final Result   1. Normal heart size. No pleural effusion seen. No evidence for pneumothorax following recent thoracentesis left side. 2. MediPort right side, catheter tip in right atrium. 3. Relatively diffuse infiltrative process in the left lung which has worsened since prior study, consistent with the known infiltrative left lung mass and probably some adjacent atelectasis/pneumonia. Overall appearance is worsened since prior study. **This report has been created using voice recognition software. It may contain minor errors which are inherent in voice recognition technology. **      Final report electronically signed by Dr. Becky Carballo on 12/9/2023 2:37 PM      US THORACENTESIS Which side should the procedure be performed? Left   Final Result   Status post thoracentesis            **This report has been created using voice recognition software. It may contain minor errors which are inherent in voice recognition technology. **      Final

## 2023-12-10 NOTE — PLAN OF CARE
Problem: Discharge Planning  Goal: Discharge to home or other facility with appropriate resources  Outcome: Progressing     Problem: Respiratory - Adult  Goal: Achieves optimal ventilation and oxygenation  Outcome: Progressing     Problem: Cardiovascular - Adult  Goal: Maintains optimal cardiac output and hemodynamic stability  Outcome: Progressing  Goal: Absence of cardiac dysrhythmias or at baseline  Outcome: Progressing     Problem: Safety - Adult  Goal: Free from fall injury  Outcome: Progressing   Care plan reviewed with patient and family. Patient and family verbalize understanding of the plan of care and contribute to goal setting. Problem: Discharge Planning  Goal: Discharge to home or other facility with appropriate resources  Outcome: Progressing     Problem: Respiratory - Adult  Goal: Achieves optimal ventilation and oxygenation  Outcome: Progressing     Problem: Cardiovascular - Adult  Goal: Maintains optimal cardiac output and hemodynamic stability  Outcome: Progressing  Goal: Absence of cardiac dysrhythmias or at baseline  Outcome: Progressing     Problem: Safety - Adult  Goal: Free from fall injury  Outcome: Progressing     Problem: Skin/Tissue Integrity  Goal: Absence of new skin breakdown  Description: 1. Monitor for areas of redness and/or skin breakdown  2. Assess vascular access sites hourly  3. Every 4-6 hours minimum:  Change oxygen saturation probe site  4. Every 4-6 hours:  If on nasal continuous positive airway pressure, respiratory therapy assess nares and determine need for appliance change or resting period. Outcome: Progressing   Care plan reviewed with patient and family. Patient and family verbalize understanding of the plan of care and contribute to goal setting.

## 2023-12-10 NOTE — CONSULTS
[facility]    Patient - Dang Arechiga   MRN -  [id number]   [facility]   - 1955      Date of Admission -  2023 10:34 AM  Date of evaluation -  12/10/2023  Room - --A   [facility] [address] David Garcia MD Primary Care Physician - KILLIAN Maldonado - CNP     Problem List      Active Hospital Problems    Diagnosis Date Noted    Acute respiratory failure with hypoxia [facility]) [J96.01] 2023     Reason for Consult    Lung cancer, hypoxemia, pneumonia  HPI   Dang Arechiga is a 77 y.o. male admitted for acute respiratory failure. He was found to be hypoxic and specials when he was undergoing a Mediport placement. He required 2 L during the procedure. He went to recovery and was noted to be hypoxic and his room air pulse ox was less than 87%. The oncology office was contacted and recommended he be sent to the emergency room for evaluation. At the time of his arrival in the emergency department he is found be afebrile with an SpO2 of 88% on room air. He underwent a CT angiogram which demonstrated the left pulmonary artery being encased by progression of a left hilar and left perihilar consolidation. He has known stage IV lung cancer. He had shortness of breath with exertion. He send occasional cough. His wife states that he did have a low-grade fever prior to admission. He underwent a thoracentesis yesterday and approximately 700 mL of fluid was removed. He follow with Dr. Trav Manzano and Lorene Rodriguez in our office.   PMHx   Past Medical History      Diagnosis Date    Hypertension     Lung cancer ([address]) 10/30/2023      Past Surgical History        Procedure Laterality Date    BRONCHOSCOPY N/A 10/30/2023    BRONCHOSCOPY W/EBUS FNA performed by Summer Crabtree DO at [address] [place] [address]  10/30/2023    BRONCHOSCOPY ALVEOLAR LAVAGE performed by Summer Crabtree DO at [address] [place] [address]  10/30/2023 murmur, gallop or rub. Abdomen - Soft, nontender, nondistended, no masses or organomegaly  Neurologic - CN II-XII are grossly intact. There are no focal motor or sensory deficits  Skin - No bruising or bleeding  Extremities - No cyanosis, clubbing or edema    Labs  - Old records and notes have been reviewed in [facility]   CBC     Lab Results   Component Value Date/Time    WBC 19.6 12/10/2023 05:26 AM    RBC 3.13 12/10/2023 05:26 AM    HGB 7.3 12/10/2023 05:26 AM    HCT 26.0 12/10/2023 05:26 AM     12/10/2023 05:26 AM    MCV 83.1 12/10/2023 05:26 AM    MCH 23.3 12/10/2023 05:26 AM    MCHC 28.1 12/10/2023 05:26 AM    RDW 19.0 12/06/2023 01:24 PM    NRBC 0 12/10/2023 05:26 AM    SEGSPCT 84.3 12/10/2023 05:26 AM    MONOPCT 7.5 12/10/2023 05:26 AM    MONOSABS 1.5 12/10/2023 05:26 AM    LYMPHSABS 1.4 12/10/2023 05:26 AM    EOSABS 0.0 12/10/2023 05:26 AM    BASOSABS 0.0 12/10/2023 05:26 AM     BMP   Lab Results   Component Value Date/Time     12/09/2023 05:26 AM    K 4.1 12/09/2023 05:26 AM    CL 97 12/09/2023 05:26 AM    CO2 28 12/09/2023 05:26 AM    BUN 17 12/09/2023 05:26 AM    CREATININE 0.5 12/09/2023 05:26 AM    GLUCOSE 83 12/09/2023 05:26 AM    CALCIUM 10.2 12/09/2023 05:26 AM    MG 2.0 12/08/2023 11:11 AM     LFTS  Lab Results   Component Value Date/Time    ALKPHOS 159 12/09/2023 05:26 AM    ALT 6 12/09/2023 05:26 AM    AST 14 12/09/2023 05:26 AM    PROT 6.6 12/09/2023 05:26 AM    BILITOT 0.3 12/09/2023 05:26 AM    BILIDIR <0.2 12/09/2023 05:26 AM    LABALBU 1.8 12/09/2023 05:26 AM     ABG   Lab Results   Component Value Date/Time    PH 7.44 12/10/2023 06:58 AM    PCO2 51 12/10/2023 06:58 AM    PO2 74 12/10/2023 06:58 AM    HCO3 34 12/10/2023 06:58 AM    O2SAT 95 12/10/2023 06:58 AM     Pleural fluid results  Protein 3.7    Serum protein 6.6  Serum     PFTs   None completed yet  Cultures    pending  Radiology    CXR  12/09/23     IMPRESSION:  1. Normal heart size. No pleural effusion seen.

## 2023-12-11 ENCOUNTER — APPOINTMENT (OUTPATIENT)
Dept: GENERAL RADIOLOGY | Age: 68
End: 2023-12-11
Payer: MEDICARE

## 2023-12-11 ENCOUNTER — CLINICAL DOCUMENTATION (OUTPATIENT)
Dept: CASE MANAGEMENT | Age: 68
End: 2023-12-11

## 2023-12-11 ENCOUNTER — HOSPITAL ENCOUNTER (OUTPATIENT)
Dept: INFUSION THERAPY | Age: 68
Discharge: HOME | End: 2023-12-11

## 2023-12-11 PROBLEM — C34.90 LUNG CANCER METASTATIC TO BONE (HCC): Status: ACTIVE | Noted: 2023-01-01

## 2023-12-11 PROBLEM — Z51.5 PALLIATIVE CARE PATIENT: Status: ACTIVE | Noted: 2023-01-01

## 2023-12-11 PROBLEM — C79.51 LUNG CANCER METASTATIC TO BONE (HCC): Status: ACTIVE | Noted: 2023-12-11

## 2023-12-11 PROBLEM — J90 PLEURAL EFFUSION ON LEFT: Status: ACTIVE | Noted: 2023-01-01

## 2023-12-11 PROBLEM — G89.3 CANCER RELATED PAIN: Status: ACTIVE | Noted: 2023-01-01

## 2023-12-11 PROBLEM — R05.3 CHRONIC COUGH: Status: ACTIVE | Noted: 2023-12-11

## 2023-12-11 PROBLEM — E43 SEVERE MALNUTRITION (HCC): Status: ACTIVE | Noted: 2023-01-01

## 2023-12-11 LAB
BASOPHILS ABSOLUTE: 0 THOU/MM3 (ref 0–0.1)
BASOPHILS NFR BLD AUTO: 0.2 %
DEPRECATED RDW RBC AUTO: 59.5 FL (ref 35–45)
EOSINOPHIL NFR BLD AUTO: 0.3 %
EOSINOPHILS ABSOLUTE: 0.1 THOU/MM3 (ref 0–0.4)
ERYTHROCYTE [DISTWIDTH] IN BLOOD BY AUTOMATED COUNT: 19.4 % (ref 11.5–14.5)
HCT VFR BLD AUTO: 26.8 % (ref 42–52)
HGB BLD-MCNC: 7.4 GM/DL (ref 14–18)
HYPOCHROMIA BLD QL SMEAR: PRESENT
IMM GRANULOCYTES # BLD AUTO: 0.23 THOU/MM3 (ref 0–0.07)
IMM GRANULOCYTES NFR BLD AUTO: 1.2 %
LYMPHOCYTES ABSOLUTE: 1.2 THOU/MM3 (ref 1–4.8)
LYMPHOCYTES NFR BLD AUTO: 6.3 %
MCH RBC QN AUTO: 23.2 PG (ref 26–33)
MCHC RBC AUTO-ENTMCNC: 27.6 GM/DL (ref 32.2–35.5)
MCV RBC AUTO: 84 FL (ref 80–94)
MONOCYTES ABSOLUTE: 1.3 THOU/MM3 (ref 0.4–1.3)
MONOCYTES NFR BLD AUTO: 6.8 %
NEUTROPHILS NFR BLD AUTO: 85.2 %
NRBC BLD AUTO-RTO: 0 /100 WBC
PLATELET # BLD AUTO: 421 THOU/MM3 (ref 130–400)
PMV BLD AUTO: 8.8 FL (ref 9.4–12.4)
RBC # BLD AUTO: 3.19 MILL/MM3 (ref 4.7–6.1)
SEGMENTED NEUTROPHILS ABSOLUTE COUNT: 16.4 THOU/MM3 (ref 1.8–7.7)
WBC # BLD AUTO: 19.3 THOU/MM3 (ref 4.8–10.8)

## 2023-12-11 PROCEDURE — 85025 COMPLETE CBC W/AUTO DIFF WBC: CPT

## 2023-12-11 PROCEDURE — 6370000000 HC RX 637 (ALT 250 FOR IP): Performed by: STUDENT IN AN ORGANIZED HEALTH CARE EDUCATION/TRAINING PROGRAM

## 2023-12-11 PROCEDURE — 99233 SBSQ HOSP IP/OBS HIGH 50: CPT | Performed by: INTERNAL MEDICINE

## 2023-12-11 PROCEDURE — 94640 AIRWAY INHALATION TREATMENT: CPT

## 2023-12-11 PROCEDURE — 6360000002 HC RX W HCPCS: Performed by: INTERNAL MEDICINE

## 2023-12-11 PROCEDURE — 71046 X-RAY EXAM CHEST 2 VIEWS: CPT

## 2023-12-11 PROCEDURE — 2580000003 HC RX 258: Performed by: INTERNAL MEDICINE

## 2023-12-11 PROCEDURE — 36415 COLL VENOUS BLD VENIPUNCTURE: CPT

## 2023-12-11 PROCEDURE — 99232 SBSQ HOSP IP/OBS MODERATE 35: CPT | Performed by: INTERNAL MEDICINE

## 2023-12-11 PROCEDURE — 1200000003 HC TELEMETRY R&B

## 2023-12-11 PROCEDURE — 6360000002 HC RX W HCPCS: Performed by: STUDENT IN AN ORGANIZED HEALTH CARE EDUCATION/TRAINING PROGRAM

## 2023-12-11 PROCEDURE — 2580000003 HC RX 258: Performed by: STUDENT IN AN ORGANIZED HEALTH CARE EDUCATION/TRAINING PROGRAM

## 2023-12-11 PROCEDURE — 99223 1ST HOSP IP/OBS HIGH 75: CPT | Performed by: STUDENT IN AN ORGANIZED HEALTH CARE EDUCATION/TRAINING PROGRAM

## 2023-12-11 PROCEDURE — P9047 ALBUMIN (HUMAN), 25%, 50ML: HCPCS | Performed by: INTERNAL MEDICINE

## 2023-12-11 RX ORDER — OXYCODONE HYDROCHLORIDE 5 MG/1
5 TABLET ORAL
Status: DISCONTINUED | OUTPATIENT
Start: 2023-12-11 | End: 2023-12-13

## 2023-12-11 RX ORDER — GABAPENTIN 100 MG/1
100 CAPSULE ORAL 3 TIMES DAILY
Status: DISCONTINUED | OUTPATIENT
Start: 2023-12-11 | End: 2023-12-13

## 2023-12-11 RX ORDER — POLYETHYLENE GLYCOL 3350 17 G/17G
17 POWDER, FOR SOLUTION ORAL DAILY
Status: DISCONTINUED | OUTPATIENT
Start: 2023-12-11 | End: 2023-12-12

## 2023-12-11 RX ORDER — SENNOSIDES A AND B 8.6 MG/1
2 TABLET, FILM COATED ORAL 2 TIMES DAILY
Status: DISCONTINUED | OUTPATIENT
Start: 2023-12-11 | End: 2023-12-13

## 2023-12-11 RX ORDER — CODEINE PHOSPHATE AND GUAIFENESIN 10; 100 MG/5ML; MG/5ML
5 SOLUTION ORAL EVERY 6 HOURS PRN
Status: CANCELLED | OUTPATIENT
Start: 2023-12-11

## 2023-12-11 RX ORDER — MORPHINE SULFATE 2 MG/ML
2 INJECTION, SOLUTION INTRAMUSCULAR; INTRAVENOUS
Status: DISCONTINUED | OUTPATIENT
Start: 2023-12-11 | End: 2023-12-14 | Stop reason: HOSPADM

## 2023-12-11 RX ORDER — ALBUMIN (HUMAN) 12.5 G/50ML
25 SOLUTION INTRAVENOUS ONCE
Status: COMPLETED | OUTPATIENT
Start: 2023-12-11 | End: 2023-12-11

## 2023-12-11 RX ADMIN — SODIUM CHLORIDE, PRESERVATIVE FREE 10 ML: 5 INJECTION INTRAVENOUS at 09:27

## 2023-12-11 RX ADMIN — MIRTAZAPINE 7.5 MG: 7.5 TABLET ORAL at 19:47

## 2023-12-11 RX ADMIN — TIOTROPIUM BROMIDE AND OLODATEROL 2 PUFF: 3.124; 2.736 SPRAY, METERED RESPIRATORY (INHALATION) at 10:10

## 2023-12-11 RX ADMIN — GABAPENTIN 100 MG: 100 CAPSULE ORAL at 15:58

## 2023-12-11 RX ADMIN — ENOXAPARIN SODIUM 40 MG: 100 INJECTION SUBCUTANEOUS at 09:27

## 2023-12-11 RX ADMIN — METOPROLOL SUCCINATE 25 MG: 25 TABLET, EXTENDED RELEASE ORAL at 09:27

## 2023-12-11 RX ADMIN — SENNOSIDES 17.2 MG: 8.6 TABLET, FILM COATED ORAL at 15:58

## 2023-12-11 RX ADMIN — ONDANSETRON 4 MG: 2 INJECTION INTRAMUSCULAR; INTRAVENOUS at 15:59

## 2023-12-11 RX ADMIN — ALBUMIN (HUMAN) 25 G: 0.25 INJECTION, SOLUTION INTRAVENOUS at 19:58

## 2023-12-11 RX ADMIN — ATORVASTATIN CALCIUM 5 MG: 10 TABLET, FILM COATED ORAL at 19:47

## 2023-12-11 RX ADMIN — FOLIC ACID 1 MG: 1 TABLET ORAL at 09:27

## 2023-12-11 RX ADMIN — PIPERACILLIN AND TAZOBACTAM 4500 MG: 4; .5 INJECTION, POWDER, FOR SOLUTION INTRAVENOUS at 09:33

## 2023-12-11 RX ADMIN — PIPERACILLIN AND TAZOBACTAM 3375 MG: 3; .375 INJECTION, POWDER, LYOPHILIZED, FOR SOLUTION INTRAVENOUS at 13:50

## 2023-12-11 RX ADMIN — GABAPENTIN 100 MG: 100 CAPSULE ORAL at 19:47

## 2023-12-11 RX ADMIN — ACETAMINOPHEN 650 MG: 325 TABLET ORAL at 15:58

## 2023-12-11 ASSESSMENT — PAIN SCALES - GENERAL
PAINLEVEL_OUTOF10: 0
PAINLEVEL_OUTOF10: 0
PAINLEVEL_OUTOF10: 5
PAINLEVEL_OUTOF10: 0

## 2023-12-11 NOTE — PROGRESS NOTES
Comprehensive Nutrition Assessment    Type and Reason for Visit:  Initial, Positive Nutrition Screen (weight loss and decreased oral intake)    Nutrition Recommendations/Plan:   Continue diet as ordered and encourage PO intake at best efforts. Consider appetite stimulant as medically appropriate. ONS ordered: ensure enlive TID, magic cups TID, and ice cream TID. Monitoring all nutrition aspects and will provide further recommendations as necessary and appropriate. Malnutrition Assessment:  Malnutrition Status:  Severe malnutrition (12/11/23 1501)    Context:  Chronic Illness     Findings of the 6 clinical characteristics of malnutrition:  Energy Intake:  75% or less estimated energy requirements for 1 month or longer  Weight Loss:   (7.8% loss within ~1 month)     Body Fat Loss:  Severe body fat loss Orbital, Triceps, Fat Overlying Ribs, Buccal region   Muscle Mass Loss:  Severe muscle mass loss Temples (temporalis), Clavicles (pectoralis & deltoids), Thigh (quadraceps), Calf (gastrocnemius), Hand (interosseous), Scapula (trapezius)  Fluid Accumulation:  No significant fluid accumulation     Strength:  Not Performed    Nutrition Assessment:     Pt. severely malnourished AEB criteria listed above. At risk for further nutritional compromise r/t admit with SOB secondary to lung cancer, moderate L pleural effusion, and underlying medical condition (PMH: HTN, lung cancer). Nutrition Related Findings:    Pt. Report/Treatments/Miscellaneous: pt and wife seen. Appetite okay. Follow with mikel at cancer center. Agreeable to supplements. GI Status: last BM 12/9  Pertinent Labs: 12/9: Na 138, K 4.1, BUN 17, creatinine 0.5, magnesium 2, glucose 83, hgb 7.4  Pertinent Meds: folvite, remeron, zosyn      Wound Type:  (buttocks stage 1)       Current Nutrition Intake & Therapies:    Average Meal Intake: 1-25%  Average Supplements Intake:  (to start)  ADULT DIET;  Dysphagia - Soft and Bite Sized  ADULT ORAL

## 2023-12-11 NOTE — PLAN OF CARE
Problem: Discharge Planning  Goal: Discharge to home or other facility with appropriate resources  12/11/2023 0150 by Becca Sandoval RN  Outcome: Progressing  12/10/2023 1445 by Stormy Nazario RN  Outcome: Progressing     Problem: Respiratory - Adult  Goal: Achieves optimal ventilation and oxygenation  12/11/2023 0150 by Becca Sandoval RN  Outcome: Progressing  12/10/2023 1445 by Stormy Nazario RN  Outcome: Progressing     Problem: Cardiovascular - Adult  Goal: Maintains optimal cardiac output and hemodynamic stability  12/11/2023 0150 by Becca Sandoval RN  Outcome: Progressing  12/10/2023 1445 by Stormy Nazario RN  Outcome: Progressing  Goal: Absence of cardiac dysrhythmias or at baseline  12/11/2023 0150 by Becca Sandoval RN  Outcome: Progressing  12/10/2023 1445 by Stormy Nazario RN  Outcome: Progressing     Problem: Safety - Adult  Goal: Free from fall injury  12/11/2023 0150 by Becca Sandoval RN  Outcome: Progressing  12/10/2023 1445 by Stormy Nazario RN  Outcome: Progressing     Problem: Skin/Tissue Integrity  Goal: Absence of new skin breakdown  Description: 1. Monitor for areas of redness and/or skin breakdown  2. Assess vascular access sites hourly  3. Every 4-6 hours minimum:  Change oxygen saturation probe site  4. Every 4-6 hours:  If on nasal continuous positive airway pressure, respiratory therapy assess nares and determine need for appliance change or resting period.   12/11/2023 0150 by Becca Sandoval RN  Outcome: Progressing  12/10/2023 1446 by Stormy Nazario RN  Outcome: Progressing

## 2023-12-11 NOTE — CARE COORDINATION
Case Management Assessment  Initial Evaluation    Date/Time of Evaluation: 12/11/2023 3:07 PM  Assessment Completed by: Charlotte Perez RN    If patient is discharged prior to next notation, then this note serves as note for discharge by case management. Patient Name: Steffen Boswell                   YOB: 1955  Diagnosis: Acute respiratory failure with hypoxia [facility]) [J96.01]                   Date / Time: 12/8/2023 10:34 AM  Location: [facility]     Patient Admission Status: Inpatient   Readmission Risk Low 0-14, Mod 15-19), High > 20: Readmission Risk Score: 17.8    Current PCP: KILLIAN Reyes CNP  PCP verified by CM? Yes    Chart Reviewed: Yes      History Provided by: Patient, Spouse  Patient Orientation: Alert and Oriented    Patient Cognition: Alert    Hospitalization in the last 30 days (Readmission):  No    If yes, Readmission Assessment in CM Navigator will be completed. Advance Directives:      Code Status: DNR-CCA   Patient's Primary Decision Maker is: Legal Next of Kin John Luna)      Discharge Planning:    Patient lives with: Spouse/Significant Other Type of Home: House  Primary Care Giver: Other (Comment) (self, assist from wife [facility])  Patient Support Systems include: Spouse/Significant Other, Family Members   Current Financial resources: Medicare, Other (Comment) ([employer] secondary)  Current community resources: Other (Comment) (OP chemo infusions to start q3 weeks)  Current services prior to admission: Other (Comment), Durable Medical Equipment (OP chemo infusions to start q3 weeks)            Current DME: Other (Comment) (has canes available, having grab bar installed to entrance steps.)            Type of Home Care services:  None    ADLS  Prior functional level: Independent in ADLs/IADLs  Current functional level: Independent in ADLs/IADLs    Family can provide assistance at DC:  Yes  Would you like Case Management to discuss the discharge plan with any other family

## 2023-12-11 NOTE — CONSULTS
Physician Consult Note        Patient:   Francy Mckay  YOB: 1955  Age:  77 y.o. Room:  [facility]03/003-A  MRN:  [id number]   Acct: [de-identified]  PCP: KILLIAN Figueredo CNP    Date of Admission:  12/8/2023 10:34 AM  Date of Service:  12/11/2023    Reason for Consult: Goals of Care, Symptom Management, and End Stage Disease             Subjective   Chief Complaint:-    Chief Complaint   Patient presents with    Low pulse ox after mediport placement        History Obtained From:-  patient, spouse    History of Present Illness:-            Francy Mckay is a 77 y.o. male who  has a past medical history of Hypertension and Lung cancer ([address]). They present to the hospital with Low pulse ox after mediport placement   and are admitted for Acute respiratory failure with hypoxia ([address] Palliative care was consulted for Goals of Care, Symptom Management, and End Stage Disease. Patient admitted for AHRF in the setting of stage IV lung cancer, pleural effusion, and suspected post-obstructive pneumonia. Patient following with oncology and planned for OP chemotherapy initiation. Symptoms:  Pain: Location- Left sided chest  Quality- steady  Duration- Intermittent   Severity- 6 on scale of 1-10  Exacerbating factors- lying down  Pain affects- sleep  Relieving factors: sleeping on side  Past Treatments: No Medications  Shortness of breath: They report shortness of breath on exertion. Sleep: They are having trouble staying asleep. They are not sleeping well in the hospital. They are not sleeping well due to cough. Fatigue/Drowsiness: The patient reports fatigue and drowsiness. Appetite: Patient reports poor appetite. They are currently taking Remeron to help with their appetite. Their medication is  working well to help with their appetite. Wt. Loss: Patient has lost 40 lbs over the past 5 months. He has been taking Remeron 7.5mg daily for appetite stimulation.   Nausea/Vomiting: that it frequently interrupts sleep. He does have noted poor appetite that has improved following Remeron initiation. Patient does have intermittent constipation, taking MoM at home prn currently. He has not been trialed on opioid medications. Given patient extensive metastatic disease and poor pain control, will plan to initiate opioid medications to increase patient comfort. Given patient symptomatic cough that is impinging on sleep/comfort, will plan for treatment with antitussive. Extensive discussion regarding code status did occur with patient, who opted for [facility] code status after all questions were answered, patient voiced understanding to code status change. Principal Problem:    Acute respiratory failure with hypoxia (HCC)  Active Problems:    Malignant neoplasm of upper lobe of left lung (HCC)    Severe malnutrition (HCC)    Lung cancer metastatic to bone [facility])    Cancer related pain    Palliative care patient    Chronic cough  Resolved Problems:    * No resolved hospital problems. *      Change code status to [facility]  Start Oxycodone IR 5mg every 2 hours as needed for stable cancer related pain  Start Morphine IV 2mg every 2 hours as needed for breakthrough cancer related pain  Start Senna 8.6mg two tabs bid nightly constipation  Start Miralax 17g daily scheduled for constipation  Start Gabapentin 100mg every 8 hours scheduled for uncontrolled cancer related pain  Continue Mirtazapine 7.5mg nightly scheduled for improving  decreased appetite  Will refer to [address] [place] [address] on discharge  Palliative Care will continue to follow the patient while they are hospitalized   Please Perfect[employer] or call the Palliative Care team with any questions or concerns    CURRENT CODE STATUS:  DNR-CCA    The total time I spent in seeing the patient, discussing symptoms, goals of care, advanced directives, code status, counseling and other major issues was more than 25 minutes.     Parts of this note may have been dictated by use of voice recognition software and electronically transcribed.  The note may contain errors not detected in proofreading    Electronically signed by Elena Short MD on 12/11/2023 at 4:55 PM           Palliative Care Office: [phone]

## 2023-12-11 NOTE — CONSULTS
RADIATION ONCOLOGY: BRIEF NOTE:   12/11/2023    Brigida White is known to Radiation Oncology. 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 The cancer was stage IV at diagnosis, thus the recommendation was for palliative systemic therapy. Repeat consultation has been requested due to intrathoracic progression with likely post-obstructive pneumonitis. Gabriela Love will be seeing Marielena Barrientos on 12/12/2023 for re-evaluation regarding possible palliative radiation therapy to the thoracic disease.     Tess Ferrari, PhD, MD

## 2023-12-11 NOTE — PROGRESS NOTES
Name: Mayank Aldrich  : 1955  MRN: [id number]    Oncology Navigation Follow-Up Note    Contact Type:  Telephone    Notes: Spouse called to say he was admitted to hospital Friday after mediport insertion due to hypoxia and also has pneumonia. Referred to pulmonary- recent scans revealed constriction in airway and referring to radiation to start tx. Questioning about starting chemotherapy. Instructed to ask his nurse to contact admitting physician to consult Med ONC - so can be involved with his plan. Voiced understanding.         Electronically signed by Maria Teresa Kinney RN on 2023 at 4:39 PM

## 2023-12-11 NOTE — PALLIATIVE CARE
Initial Evaluation        Patient:   Ge Musa  YOB: 1955  Age:  77 y.o. [facility]  [facility]  MRN:  [id number]   Acct: [de-identified]    Date of Admission:  12/8/2023 10:34 AM  Date of Service:  12/11/2023  Completed By:  Areli Garay RN                 Reason for Palliative Care Evaluation:-               [] Code Status Discussion              [x] Goals of Care              [] Pain/Symptom Management               [] Emotional Support              [x] Other:  metastatic lung cancer                  Current Issues:-   []  Pain  []  Fatigue  []  Nausea  []  Anxiety  []  Depression  []  Shortness of Breath  []  Constipation  [x]  Appetite  []  Other:              Advance Directives:-  none on file  [] [facility] DNR Form  [] Living Will  [] Medical POA              Current Code Status:-     [x] Full Resuscitation  [] DNR-Comfort Care-Arrest  [] DNR-Comfort Care       [] Limited Resuscitation             [] No CPR            [] No shock            [] No ET intubation/reintubation            [] No resuscitative medications            [] Other limitation:               Palliative Performance Status:-      [] 100%  Full ambulation; normal activity and work; no evidence of disease; able to do own self care; normal intake; fully conscious     [] 90%   Full ambulation; normal activity and work; some evidence of disease; able to do own self care; normal intake; fully conscious    [] 80%   Full ambulation; normal activity with effort; some evidence of disease; able to do own self care; normal or reduced intake; fully conscious    [] 70%  Ambulation reduced; unable to perform normal job/work; significant  disease; able to do own self care; normal or reduced intake; fully conscious    [x] 60%  Ambulation reduced; Significant disease;Can't do hobbies/housework; intake normal or reduced; occasional assist; LOC full/confusion    [] 50%  Mainly sit/lie;  Extensive disease; Can't do any work; Considerable assist;

## 2023-12-11 NOTE — PROGRESS NOTES
[facility]    Patient - Sherwin Hensley   MRN -  [id number]   Acct # - [de-identified]   - 1955      Date of Admission -  2023 10:34 AM  Date of evaluation -  2023  Room - --[address] Slime Castellano MD Primary Care Physician - KILLIAN Vizcarra - CNP     Problem List      Active Hospital Problems    Diagnosis Date Noted    Acute respiratory failure with hypoxia [facility]) [J96.01] 2023     Reason for Consult    Stage 4 lung cancer, acute resp failure with hypoxia  HPI   Sherwin Hensley is a 77 y.o. male admitted for acute respiratory failure. He was found to be hypoxic and specials when he was undergoing a Mediport placement. He required 2 L during the procedure. He went to recovery and was noted to be hypoxic and his room air pulse ox was less than 87%. The oncology office was contacted and recommended he be sent to the emergency room for evaluation. At the time of his arrival in the emergency department he is found be afebrile with an SpO2 of 88% on room air. He underwent a CT angiogram which demonstrated the left pulmonary artery being encased by progression of a left hilar and left perihilar consolidation. He has known stage IV lung cancer. He had shortness of breath with exertion. He send occasional cough. His wife states that he did have a low-grade fever prior to admission. He underwent a thoracentesis yesterday and approximately 700 mL of fluid was removed. He follow with Dr. Jennings Hamman and Leonardo Quinones in our office.     Past 24 hrs   -Feeling better after thora   -Afebrile overnight Tmax 100 past 24hrs  -Requiring 2 L NC, VSS  -Wife at bedside  All other systems reviewed    PMHx   Past Medical History      Diagnosis Date    Hypertension     Lung cancer ([address]) 10/30/2023      Past Surgical History        Procedure Laterality Date    BRONCHOSCOPY N/A 10/30/2023    BRONCHOSCOPY W/EBUS FNA performed by Jennings Hamman,

## 2023-12-12 ENCOUNTER — HOSPITAL ENCOUNTER (INPATIENT)
Dept: CT IMAGING | Age: 68
Discharge: HOME | End: 2023-12-12
Payer: MEDICARE

## 2023-12-12 ENCOUNTER — HOSPITAL ENCOUNTER (OUTPATIENT)
Dept: RADIATION ONCOLOGY | Age: 68
Discharge: HOME | End: 2023-12-12
Payer: MEDICARE

## 2023-12-12 PROBLEM — D75.839 THROMBOCYTOSIS: Status: ACTIVE | Noted: 2023-12-12

## 2023-12-12 PROBLEM — D72.829 LEUKOCYTOSIS: Status: ACTIVE | Noted: 2023-12-12

## 2023-12-12 PROCEDURE — 3209999900 CT GUIDE RADIATION THERAPY NO CHARGE

## 2023-12-12 PROCEDURE — 99232 SBSQ HOSP IP/OBS MODERATE 35: CPT | Performed by: INTERNAL MEDICINE

## 2023-12-12 PROCEDURE — 77290 THER RAD SIMULAJ FIELD CPLX: CPT | Performed by: RADIOLOGY

## 2023-12-12 PROCEDURE — 6360000002 HC RX W HCPCS: Performed by: INTERNAL MEDICINE

## 2023-12-12 PROCEDURE — 6360000002 HC RX W HCPCS: Performed by: STUDENT IN AN ORGANIZED HEALTH CARE EDUCATION/TRAINING PROGRAM

## 2023-12-12 PROCEDURE — 6370000000 HC RX 637 (ALT 250 FOR IP): Performed by: STUDENT IN AN ORGANIZED HEALTH CARE EDUCATION/TRAINING PROGRAM

## 2023-12-12 PROCEDURE — 99233 SBSQ HOSP IP/OBS HIGH 50: CPT | Performed by: STUDENT IN AN ORGANIZED HEALTH CARE EDUCATION/TRAINING PROGRAM

## 2023-12-12 PROCEDURE — 2700000000 HC OXYGEN THERAPY PER DAY

## 2023-12-12 PROCEDURE — 97530 THERAPEUTIC ACTIVITIES: CPT

## 2023-12-12 PROCEDURE — 77332 RADIATION TREATMENT AID(S): CPT | Performed by: RADIOLOGY

## 2023-12-12 PROCEDURE — 94640 AIRWAY INHALATION TREATMENT: CPT

## 2023-12-12 PROCEDURE — 2580000003 HC RX 258: Performed by: INTERNAL MEDICINE

## 2023-12-12 PROCEDURE — 77334 RADIATION TREATMENT AID(S): CPT | Performed by: RADIOLOGY

## 2023-12-12 PROCEDURE — 97161 PT EVAL LOW COMPLEX 20 MIN: CPT

## 2023-12-12 PROCEDURE — 99233 SBSQ HOSP IP/OBS HIGH 50: CPT | Performed by: INTERNAL MEDICINE

## 2023-12-12 PROCEDURE — 2580000003 HC RX 258: Performed by: STUDENT IN AN ORGANIZED HEALTH CARE EDUCATION/TRAINING PROGRAM

## 2023-12-12 PROCEDURE — 1200000003 HC TELEMETRY R&B

## 2023-12-12 PROCEDURE — 99222 1ST HOSP IP/OBS MODERATE 55: CPT | Performed by: NURSE PRACTITIONER

## 2023-12-12 RX ORDER — POLYETHYLENE GLYCOL 3350 17 G/17G
17 POWDER, FOR SOLUTION ORAL 2 TIMES DAILY
Status: DISCONTINUED | OUTPATIENT
Start: 2023-12-12 | End: 2023-12-14 | Stop reason: HOSPADM

## 2023-12-12 RX ORDER — BISACODYL 10 MG
10 SUPPOSITORY, RECTAL RECTAL DAILY
Status: DISCONTINUED | OUTPATIENT
Start: 2023-12-12 | End: 2023-12-14 | Stop reason: HOSPADM

## 2023-12-12 RX ADMIN — SODIUM CHLORIDE, PRESERVATIVE FREE 10 ML: 5 INJECTION INTRAVENOUS at 09:47

## 2023-12-12 RX ADMIN — SENNOSIDES 17.2 MG: 8.6 TABLET, FILM COATED ORAL at 09:46

## 2023-12-12 RX ADMIN — ATORVASTATIN CALCIUM 5 MG: 10 TABLET, FILM COATED ORAL at 21:35

## 2023-12-12 RX ADMIN — METOPROLOL SUCCINATE 25 MG: 25 TABLET, EXTENDED RELEASE ORAL at 09:46

## 2023-12-12 RX ADMIN — PIPERACILLIN AND TAZOBACTAM 3375 MG: 3; .375 INJECTION, POWDER, LYOPHILIZED, FOR SOLUTION INTRAVENOUS at 04:52

## 2023-12-12 RX ADMIN — PIPERACILLIN AND TAZOBACTAM 3375 MG: 3; .375 INJECTION, POWDER, LYOPHILIZED, FOR SOLUTION INTRAVENOUS at 21:38

## 2023-12-12 RX ADMIN — POLYETHYLENE GLYCOL 3350 17 G: 17 POWDER, FOR SOLUTION ORAL at 09:47

## 2023-12-12 RX ADMIN — BISACODYL 10 MG: 10 SUPPOSITORY RECTAL at 15:05

## 2023-12-12 RX ADMIN — ALBUTEROL SULFATE 2 PUFF: 90 AEROSOL, METERED RESPIRATORY (INHALATION) at 18:23

## 2023-12-12 RX ADMIN — GABAPENTIN 100 MG: 100 CAPSULE ORAL at 09:46

## 2023-12-12 RX ADMIN — BENZONATATE 100 MG: 100 CAPSULE ORAL at 18:22

## 2023-12-12 RX ADMIN — SENNOSIDES 17.2 MG: 8.6 TABLET, FILM COATED ORAL at 15:05

## 2023-12-12 RX ADMIN — GABAPENTIN 100 MG: 100 CAPSULE ORAL at 15:13

## 2023-12-12 RX ADMIN — ENOXAPARIN SODIUM 40 MG: 100 INJECTION SUBCUTANEOUS at 09:47

## 2023-12-12 RX ADMIN — MIRTAZAPINE 7.5 MG: 7.5 TABLET ORAL at 21:35

## 2023-12-12 RX ADMIN — PIPERACILLIN AND TAZOBACTAM 3375 MG: 3; .375 INJECTION, POWDER, LYOPHILIZED, FOR SOLUTION INTRAVENOUS at 15:10

## 2023-12-12 RX ADMIN — GABAPENTIN 100 MG: 100 CAPSULE ORAL at 21:35

## 2023-12-12 RX ADMIN — FOLIC ACID 1 MG: 1 TABLET ORAL at 09:46

## 2023-12-12 ASSESSMENT — PAIN SCALES - GENERAL: PAINLEVEL_OUTOF10: 0

## 2023-12-12 NOTE — CONSULTS
[facility]    Patient Hilary Schwartz   MRN -  [id number]   [address] [place] [address] # - [de-identified]   - 1955      Date of Admission -  2023 10:34 AM  Date of evaluation -  2023  Room - 8A--A   [facility] Primary Care Physician - KILLIAN Galvan - CNP     Inpatient consult to Oncology  Consult performed by: KILLIAN Amado - CNP  Consult ordered by: Ania Mckeon MD           Reason for Consult    \"Lung ca with mets, with SOB, PNA, and pleural effusion\"  [address] Problems    Diagnosis Date Noted    Severe malnutrition ([address]) [E43] 2023    Lung cancer metastatic to bone ([address]) [C34.90, C79.51] 2023    Cancer related pain [G89.3] 2023    Palliative care patient [Z51.5] 2023    Chronic cough [R05.3] 2023    Pleural effusion on left [J90] 2023    Acute respiratory failure with hypoxia ([address]) [J96.01] 2023    Malignant neoplasm of upper lobe of left lung [facility]) [C34.12] 2023     HPI   Hilary Schwartz is a 77 y.o. male admitted 2023 for evaluation of hypoxia. 88% RA. Increased WOB. CTA Chest 2023 (-) PE, (+) lymphadenopathy progression from prior with encasement of left main pulmonary artery, left perihilar/ROBERTO consolidation; markedly worsened. All mediastinal, hilar, and axillary LNs have increased in size; new moderate left pleural effusion and LLL consolidation, worsening left pleural thickening and heterogeneously enhancing consolidative lung in lingular suspicious for pleural and parenchymal metastasis; diffuse patchy bilateral upper and lower lobe groundglass opacity possibly pulmonary edema vs airspace infection/inflammation. S/p thoracentesis 2023 with 700 ml fluid removed. Pleural fluid (-) malignant cells. Pulmonology consulted. Oncology consulted. Radiation Oncology consulted.       2023:  Pt resting in bed, family at bedside. Pt reports dry cough, increased SOB improved with supplemental oxygen at 2 lpm nc. Ongoing constipation. One bout of nausea yesterday, relieved with zofran. He denies fever/chills, headaches, dizziness, CP, heart palpitations, abdominal pain, peripheral edema/neuropathy, s/s bleeding. Oncology History    Newly diagnosed stage IV lung cancer. Meds    Current Medications    piperacillin-tazobactam  3,375 mg IntraVENous Q8H    senna  2 tablet Oral BID    polyethylene glycol  17 g Oral Daily    gabapentin  100 mg Oral TID    sodium chloride flush  5-40 mL IntraVENous 2 times per day    enoxaparin  40 mg SubCUTAneous Daily    atorvastatin  5 mg Oral Nightly    folic acid  1 mg Oral Daily    metoprolol succinate  25 mg Oral Daily    mirtazapine  7.5 mg Oral Nightly    tiotropium-olodaterol  2 puff Inhalation Daily     oxyCODONE, morphine, sodium chloride flush, sodium chloride, potassium chloride **OR** potassium alternative oral replacement **OR** potassium chloride, magnesium sulfate, ondansetron **OR** ondansetron, acetaminophen **OR** acetaminophen, benzonatate, albuterol sulfate HFA  IV Drips/Infusions   sodium chloride       Past Medical History         Diagnosis Date    Hypertension     Lung cancer ([address]) 10/30/2023      Past Surgical History           Procedure Laterality Date    BRONCHOSCOPY N/A 10/30/2023    BRONCHOSCOPY W/EBUS FNA performed by Nino Hahn DO at [address] [place] [address]  10/30/2023    [address] [place] [address] performed by Nino Hahn DO at [address] [place] [address]  10/30/2023    BRONCHOSCOPY BRUSHINGS performed by Nino Hahn DO at [address] [facility]  10/30/2023    [facility]  10/30/2023     Diet    ADULT DIET;  Dysphagia - Soft and Bite Sized  ADULT ORAL NUTRITION SUPPLEMENT; Breakfast, Dinner, Lunch; Standard High Calorie/High Protein Oral Supplement  ADULT ORAL NUTRITION SUPPLEMENT; Breakfast, lymphadenopathy. Path (+) poorly differentiated carcinoma with rhabdoid features. PET 11/1/2023 (+) FDG avid left lung mass, 8.7 cm with SUV 34.2, adjacent nodules likely malignant, left hilar lymphadenopathy, FDG avid mediastinal lymphadenopathy, FDG avid nodule adjacent to pancreatic head, lytic lesion left lateral fourth rib. MRI brain (-). Current treatment recommendations include systemic chemoimmunotherapy with pembrolizumab/carboplatin/pemetrexed, first dose due yesterday but on hold d/t hospitalization. Radiation Oncology consulted to evaluate for palliative radiation. Await recommendations. No plans for chemotherapy during acute hospitalization/infection. Pt has appnt with GI tomorrow to evaluate possible pancreatic mass, wife states they are rescheduling. 2.   Leukocytosis  WBC 19.3. Afebrile. CTA Chest 12/8/2023 (-) PE, (+) lymphadenopathy progression from prior with encasement of left main pulmonary artery, left perihilar/ROBERTO consolidation; markedly worsened. All mediastinal, hilar, and axillary LNs have increased in size; new moderate left pleural effusion and LLL consolidation, worsening left pleural thickening and heterogeneously enhancing consolidative lung in lingular suspicious for pleural and parenchymal metastasis; diffuse patchy bilateral upper and lower lobe groundglass opacity possibly pulmonary edema vs airspace infection/inflammation. IV Zosyn. Trend. 3.  Normocytic anemia  H/H 7.4/26. 8. He denies s/s bleeding. Transfusion recommendations include transfuse 1 unit PRBCs for Hgb < 7.0. Trend. 4.  Thrombocytosis  Platelet count 487, trending down. Iron deficiency noted. Ok for PO iron supplementation, can give IV Venofer once infection resolved. Could also be reactive d/t infection. Trend. Case discussed with nurse and patient/family. Questions and concerns addressed.   Plan made in collaboration with Dr. Checo Suárez.    Electronically signed by   Tamela Subramanian

## 2023-12-12 NOTE — PROGRESS NOTES
[facility]  INPATIENT PHYSICAL THERAPY  EVALUATION  [facility] MED SURG 8A - 8A-03/003-A    Time In: 1350  Time Out: 1420  Timed Code Treatment Minutes: 15 Minutes  Minutes: 30          Date: 2023  Patient Name: Samuel Perry,  Gender:  male        MRN: [id number]  : 1955  (77 y.o.)      Referring Practitioner: Shivani Dunbar MD  Diagnosis: acute respiratory failure with hypoxia  Additional Pertinent Hx: Pt admitted through specialist's office s/p mediport placement with decreased O2 sats level and hypoxia. Hx: metastatic adenocancer of lung. s/p thoracentesis Lt due to pleural effusion. Pt currently undergoing radiation for the CA     Restrictions/Precautions:  Restrictions/Precautions: General Precautions, Fall Risk    Subjective:  Chart Reviewed: Yes  Patient assessed for rehabilitation services?: Yes  Family / Caregiver Present: Yes (spouse)  Subjective: Nurse approved session. Pt resting in bed. Pleasant and cooperative. Spouse in attendance. Very supportive. General:     Vision: Within Functional Limits  Hearing: Within functional limits       Pain: /10:     Vitals: Oxygen: 2 liters via nasal cannula used throughout the session. Social/Functional History:    Lives With: Spouse  Type of Home: House  Home Layout: One level  Home Access: Stairs to enter with rails  Entrance Stairs - Number of Steps: 2  Entrance Stairs - Rails: Left (grab bar to be installed prior to discharge)  Home Equipment:  (None used PTA.)                   Ambulation Assistance: Independent  Transfer Assistance: Independent    Active : Yes (spouse has been driving mostly)          OBJECTIVE:  Range of Motion:  Right Lower Extremity: WFL  Left Lower Extremity: WFL    Strength:  Right Lower Extremity: WFL  Left Lower Extremity: hip flexion 4-, ABD tolerated mod resistance in supine.   Knee and ankle WFL    Balance:  Static Sitting Balance:  Independent  Static Standing Balance: Independent  See Sujatha below  Bed Mobility:  Supine to Sit: Stand By Assistance, with head of bed flat, without rail  Sit to Supine: Modified Independent     Transfers:  Sit to Stand: Stand By Assistance  Stand to Sit:Stand By Assistance  To/From Bed and Chair: Stand By Assistance    Ambulation:  [employer], initially. Improved to SBA to S  Distance: 15 ft, 55 ft, 20 ft  Surface: Level Tile  Device:No Device  Gait Deviations:  Decreased Step Length Bilaterally and reached out to touch wall on 1st walk. No unsteadiness noted with subsequent trials. Exercise: Tomie Belt Exercises were completed for increased independence with functional mobility. Graded lifts with UE assist x3 reps. Cues for more forward trunk flexion for improved control. Good carryover noted. Functional Outcome Measures: Completed  Balance Score: 13  Gait Score: 10  Tinetti Total Score: 23  Modified Bledsoe Scale:  Not Applicable    ASSESSMENT:  Activity Tolerance:  Patient tolerance of  treatment: good. Treatment Initiated: Treatment and education initiated within context of evaluation. Evaluation time included review of current medical information, gathering information related to past medical, social and functional history, completion of standardized testing, formal and informal observation of tasks, assessment of data and development of plan of care and goals. Treatment time included skilled education and facilitation of tasks to increase safety and independence with functional mobility for improved independence and quality of life. Assessment: Body Structures, Functions, Activity Limitations Requiring Skilled Therapeutic Intervention: Decreased endurance, Decreased balance  Assessment: Pt presents with a decline in baseline demonstrating decreased balance (Tinetti 23/28, moderate fall risk) and reported decreased endurance. Pt will benefit from skilled PT to advance in these areas for improved mobility and safety.   Therapy

## 2023-12-12 NOTE — PROGRESS NOTES
Hospitalist Progress Note      Patient:  Steffen Boswell    Unit/Bed:8A-03/003-A  YOB: 1955  MRN: [id number]   Acct: [de-identified]   PCP: KILLIAN Reyes CNP  Date of Admission: 12/8/2023    Assessment/Plan:    Acute hypoxic respiratory failure        Likely due to the pleural effusion s/p thoracentesis  left side        And number #2.,  And possible pneumonia-on IV antibiotics  2. History of metastic adeno cancer of the lung seen by oncology         Today along with rad oncology for possible palliative radiation. Pt refused tertiary care transfer for possible endobronchial stent  3. Essential hypertension on Toprol-XL 25 daily  4. Possible COPD ex tobacco usage  5. Anemia of chronic disease  6. Dyslipidemia on Lipitor 10 mg daily  7. Progressive  weakness likely due to anemia and # 2      Patient underwent thoracentesis on the left side by IR team on day admission and was being considered for discharge the patient has it was stable on 1 L but when we checked the O2 requirements he became hypoxic on room air 78%. Discharge was canceled and consulted pulmonary team, and now hem onc and rad onc teams  . Patient is on broad-spectrum IV antibiotics, and remains on supplemental oxygen. Today he was evaluated for palliative radiation  due to increased SOB. Pt was a full code initially, I spoke wit him re the options considering his advanced disease, and consulted pallitave care . Code status now changed to limited code. Long term prognosis is guarded with stage IV adenocarcinoma of the lung.      Chief Complaint: SOB    Initial H and P:-      Steffen Boswell is a 76 yo M w/significant PMHx of HTN, HLD, Chronic Anemia (ACD), Former tobacco smoker (32PY Hx, quit 2004), COPD (Emphysema - noted on imaging, formal PFT not completed yet), and recently Dx Metastatic Lung AdenoCA who presents to [facility] ED from IP well as lymph nodes which are noted at the thoracic inlet appear necrotic. 2. New moderate left pleural effusion and left lower lobe consolidation. Worsening left pleural thickening and heterogeneously enhancing consolidative lung in the lingula suspicious for pleural and parenchymal metastasis. Diffuse patchy bilateral upper and lower lobe groundglass opacity are also noted possibly pulmonary edema although airspace infection/inflammation are differential considerations. No pneumothorax is observed. **This report has been created using voice recognition software. It may contain minor errors which are inherent in voice recognition technology. ** Final report electronically signed by Dr Nicanor Montejo on 12/8/2023 12:00 PM    IR FLUORO GUIDED CVA DEVICE PLACEMENT    Result Date: 12/8/2023  MEDIPORT INSERTION: PERFORMED BY:  Lisa Elena M.D. CLINICAL INFORMATION: Lung cancer. . INSERTION SITE:  Right subclavian vein. A single permanent sonographic image was obtained for documentation of vessel patency and appropriate actions. Clemmie Numbers MEDIPORT: Single-lumen Bard PowerPort, 6French catheter, 19 CM PORT LOCATION: Right anterior chest wall CATHETER TIP:  Right atrium. SEDATION: Versed 2 mg; fentanyl 100 mcg, IV; the patient was sedated during this procedure,  and monitored with EKG and pulse ox monitoring devices by a registered nurse. Face-to-face time with patient 30 minutes. OTHER MEDICATIONS: Heparin, 500 units, \"heparin lock\" for deaccess. FLUOROSCOPY TIME: 1 minute 24 seconds FLUOROSCOPIC IMAGES: 1 REFERENCE AIR KERMA: 17 mGy ESTIMATED BLOOD LOSS: Minimal PROCEDURE:  Signed informed consent was obtained prior to performing this procedure. The patient was sedated, as indicated above. Following local anesthesia and utilizing MAXIMAL STERILE BARRIER TECHNIQUE, the vein listed above was punctured with a micropuncture needle using the Site-Rite machine for ultrasound guidance.   A sonographic image was obtained to document needle position  and vessel patency. A 0.018 wire was passed through the needle followed by insertion of a 4 FR vessel dilator . At this point, the skin along the anterior chest wall was infiltrated with lidocaine 2 percent in preparation for formation of a subcutaneous  pocket for the Mediport reservoir. Utilizing a #15 blade, an incision was made along the anterior chest wall of appropriate size to permit insertion of the Mediport reservoir. Using blunt dissection, a subcutaneous pocket appropriate size was created in the anterior chest wall. The Mediport catheter was then cut to the appropriate length and passed through the subcutaneous tunnel in the anterior chest wall and also connected to the Mediport reservoir. The Mediport catheter was then passed through a peel-away sheath at the original insertion site and the peel-away sheath was removed. The Mediport was then positioned within the subcutaneous pocket. The subcutaneous tunnel, reservoir pocket, and skin wounds were flushed several times with sterile saline. . The incisions were then closed with 3-0 Vicryl suture, utilizing predominantly subcuticular technique. . The Mediport reservoir was flushed with saline and found to flush and aspirate well. The MediPort was then flushed with heparin. A fluoroscopic spot film of the chest was obtained to confirm appropriate MediPort and catheter position. Dermabond skin adhesive and Steri-Strips were applied to the wound sites along with a sterile OpSite dressing. Status post successful MediPort insertion. **This report has been created using voice recognition software. It may contain minor errors which are inherent in voice recognition technology. ** Final report electronically signed by Dr. Claudia Naik on 12/8/2023 9:02 AM      Electronically signed by Magalis Viveros MD on 12/12/2023 at 8:44 PM

## 2023-12-12 NOTE — CONSULTS
[facility]           [id number] [place] [address], [address] [place] [address] Donovan Grijalvable: [phone]        F: [phone]       [email or web address]            INPATIENT CONSULTATION    Date of Service: 2023  Patient ID: Elena Zurita   : 1955  MRN: [id number]   Acct Number: [de-identified]         Requesting Provider: Primary inpatient team  Reason for request: Evaluation for the potential role of palliative radiation therapy. CONSULTANT: Padmini Dalal PA-C    CHIEF COMPLAINT: Evaluation for the potential role of palliative radiation therapy. ASSESSMENT:   Cancer Staging   Malignant neoplasm of upper lobe of left lung [facility])  Staging form: Lung, AJCC 8th Edition  - Clinical stage from 2023: Stage IVB (cT4, cN2, cM1c) - Signed by Beba Thornton MD on 2023  Staging comments: Poorly differentiated carcinoma with rhabdoid features      Elena Zurita is a 77 y.o. 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 He was to begin systemic therapy soon, but during port placement 23, his SpO2 was noted to be around 88% and was sent to the ED. CTA chest 23 demonstrated worsening left hilar lymphadenopathy and left perihilar/left upper lobe consolidation. Pulmonology was consulted and felt he may have obstruction due to his cancer and started the patient on antibiotics for post-obstructive pneumonia. Dr. Leanne Calix recommended transfer to tertiary care facility for endobronchial stent placement vs palliative radiation to the lung, and the patient refused transfer but is willing to consider palliative radiation. Radiation oncology was consulted for evaluation of the potential role of radiation therapy. PLAN:    - Patient was seen inpatient with wife in room, stable and in no acute distress  - Patient breathing comfortably on 2 L/min via nasal cannula.   He states that his breathing is

## 2023-12-12 NOTE — PROGRESS NOTES
Pulmonary:      Effort: Pulmonary effort is normal. No respiratory distress. Neurological:      Mental Status: He is alert and oriented to person, place, and time. Psychiatric:         Mood and Affect: Affect normal.         Speech: Speech normal. He is communicative. Behavior: Behavior normal. Behavior is not agitated, aggressive or hyperactive. Behavior is cooperative. Cognition and Memory: Cognition is not impaired. Palliative Performance Scale   60%  Ambulation reduced; Significant disease; Can't do hobbies/housework; intake normal or reduced; occasional assist; LOC full/confusion     Assessment and Plan   Bryson Mccormick is a 77 y.o. who is admitted to [address] [place] [address] for Acute respiratory failure with hypoxia ([address] Palliative care is consulted for Goals of Care and Symptom Management. Quinn is doing relatively well this morning. His cough is better since starting gabapentin. His pain remains well-controlled without narcotics today. We will continue these just in case his pain were to get worse. He is still not yet had a bowel movement. Today we will order a suppository with plans for an enema tomorrow as it has been a week since he has had a bowel movement. Palliative care will also continue to follow him while he is hospitalized. Principal Problem:    Acute respiratory failure with hypoxia (HCC)  Active Problems:    Malignant neoplasm of upper lobe of left lung (HCC)    Severe malnutrition (HCC)    Lung cancer metastatic to bone (HCC)    Cancer related pain    Palliative care patient    Chronic cough    Pleural effusion on left  Resolved Problems:    * No resolved hospital problems.  *     Continue current plan of care  Continue Oxycodone IR 5 mg every 2 hours as needed for breakthrough cancer related pain, shortness of breath, and cough  Continue Morphine IV 2 mg every 2 hours as needed for breakthrough pain not controlled on oral medication, shortness of breath not controlled on oral medication, and cough  Continue Gabapentin 100 mg three times a day scheduled for  cancer related pain and cancer related cough  Continue Senna 2 tabs twice a day scheduled for  constipation  Increase Miralax 17 g twice a day scheduled for  constipation  Continue Zofran 4 mg every 8 hours as needed for  nausea and vomiting  Can continue mirtazapine for now, however there is little evidence to show that it helps improve appetite in patients with cancer  Will give suppository today and plan for enema tomorrow if he still does not have a bowel movement  Patient will continue to follow with the palliative care clinic on discharge  Will refer to [address] [place] [address] on discharge  Palliative Care will continue to follow the patient while they are hospitalized   Please PerfectServe or call the Palliative Care team with any questions or concerns    CURRENT CODE STATUS:  Limited Limited Code details: Intubation/Re-intubation No; Defibrillation/Cardioversion No; Chest Compressions No; Resuscitative Medications No; Other No Comment         Parts of this note may have been dictated by use of voice recognition software and electronically transcribed.  The note may contain errors not detected in proofreading    Electronically signed by Soledad Garcia MD on 12/12/2023 at 12:52 PM           Palliative Care Office: [phone]

## 2023-12-12 NOTE — PROGRESS NOTES
Hospitalist Progress Note      Patient:  Amadou Lara    Unit/Bed:8A-03/003-A  YOB: 1955  MRN: [id number]   Acct: [de-identified]   PCP: KILLIAN Mullins CNP  Date of Admission: 12/8/2023    Assessment/Plan:    Acute hypoxic respiratory failure        Likely due to the pleural effusion s/p thoracentesis  left side        And number #2.,  And possible pneumonia-on IV antibiotics  2. History of metastic adeno cancer of the lung  3. Essential hypertension on Toprol-XL 25 daily  4. Possible COPD ex tobacco usage  5. Anemia of chronic disease  6. Dyslipidemia on Lipitor 10 mg daily      Patient underwent thoracentesis today on the left side by IR team, and was being considered for discharge the patient has it was stable on 1 L but when we checked the O2 requirements he became hypoxic on room air 78%. Discharge was canceled and consulted pulmonary team, and now hem onc and rad onc teams  . Patient is on broad-spectrum IV antibiotics, and remains on supplemental oxygen and waiting on formal input from hem/onc  team.     Chief Complaint: SOB    Initial H and P:-      Amadou Lara is a 78 yo M w/significant PMHx of HTN, HLD, Chronic Anemia (ACD), Former tobacco smoker (32PY Hx, quit 2004), COPD (Emphysema - noted on imaging, formal PFT not completed yet), and recently Dx Metastatic Lung AdenoCA who presents to [facility] ED from [facility] for evaluation of worsening SOB / Hypoxia / Tachycardia s/p Mediport placement. required 2LNC during procedure -> Post-pocedure, pt went to recovery, noted to have SpO2 86-91% w/Deep breaths -> could not keep >87% on RA so Oncology office was called, stated this was new and fo pt to go to ED for evaluation. On Arrival, Afebrile, R 17 w/SpO2 88% on RA -> RR 26-36 on 2LNC w/SpO2 >93%, -113, BP stable. Unremarkable BMP, HsT 35, Albumin 2.2, .  , WBC 16.6 (down from 18.7), voice recognition software. It may contain minor errors which are inherent in voice recognition technology. **      Final report electronically signed by Dr Alton Alejandro on 12/8/2023 12:00 PM        US THORACENTESIS Which side should the procedure be performed? Left    Result Date: 12/9/2023  THORACENTESIS WITH ULTRASOUND GUIDANCE: PERFORMED BY: Alfonso Magaña M.D. CLINICAL INFORMATION: Pleural effusion APPROACH: Left side posteriorly. . Multiple permanent sonographic images were obtained during procedure for documentation. FLUID WITHDRAWN: 700 mL of clear yellow serous fluid. ESTIMATED BLOOD LOSS: Minimal PROCEDURE: Signed informed consent was obtained prior to performing this procedure. The thorax was initially evaluated sonographically to determine appropriate puncture site. The skin was marked, prepped, and draped in a sterile fashion. Following local anesthesia and utilizing aseptic technique, a 5 Croatian one-step catheter was successfully inserted into the pleural effusion at the position indicated above. Pleural fluid in the amount was above was then aspirated and the needle was removed. The patient tolerated the procedure well. A post procedure chest radiograph will be obtained. Status post thoracentesis **This report has been created using voice recognition software. It may contain minor errors which are inherent in voice recognition technology. ** Final report electronically signed by Dr. Sultana Mcrae on 12/9/2023 3:16 PM    XR CHEST PORTABLE    Result Date: 12/9/2023  PROCEDURE: XR CHEST PORTABLE CLINICAL INFORMATION: POST THORACENTESIS LEFT SIDE COMPARISON: 10/30/2023 TECHNIQUE: A single mobile view of the chest was obtained. 1. Normal heart size. No pleural effusion seen. No evidence for pneumothorax following recent thoracentesis left side. 2. MediPort right side, catheter tip in right atrium.  3. Relatively diffuse infiltrative process in the left lung which has worsened since prior study, **This report has been created using voice recognition software. It may contain minor errors which are inherent in voice recognition technology. ** Final report electronically signed by Dr Elisabet Olivares on 12/8/2023 12:00 PM    IR FLUORO GUIDED CVA DEVICE PLACEMENT    Result Date: 12/8/2023  MEDIPORT INSERTION: PERFORMED BY:  Julia Ly M.D. CLINICAL INFORMATION: Lung cancer. . INSERTION SITE:  Right subclavian vein. A single permanent sonographic image was obtained for documentation of vessel patency and appropriate actions. Nell Griffin MEDIPORT: Single-lumen Bard PowerPort, 6French catheter, 19 CM PORT LOCATION: Right anterior chest wall CATHETER TIP:  Right atrium. SEDATION: Versed 2 mg; fentanyl 100 mcg, IV; the patient was sedated during this procedure,  and monitored with EKG and pulse ox monitoring devices by a registered nurse. Face-to-face time with patient 30 minutes. OTHER MEDICATIONS: Heparin, 500 units, \"heparin lock\" for deaccess. FLUOROSCOPY TIME: 1 minute 24 seconds FLUOROSCOPIC IMAGES: 1 REFERENCE AIR KERMA: 17 mGy ESTIMATED BLOOD LOSS: Minimal PROCEDURE:  Signed informed consent was obtained prior to performing this procedure. The patient was sedated, as indicated above. Following local anesthesia and utilizing MAXIMAL STERILE BARRIER TECHNIQUE, the vein listed above was punctured with a micropuncture needle using the Site-Rite machine for ultrasound guidance. A sonographic image was obtained to document needle position  and vessel patency. A 0.018 wire was passed through the needle followed by insertion of a 4 FR vessel dilator . At this point, the skin along the anterior chest wall was infiltrated with lidocaine 2 percent in preparation for formation of a subcutaneous  pocket for the Mediport reservoir. Utilizing a #15 blade, an incision was made along the anterior chest wall of appropriate size to permit insertion of the Mediport reservoir.   Using blunt dissection, a subcutaneous pocket

## 2023-12-12 NOTE — PROGRESS NOTES
[facility]    Patient - Jason Reagan   MRN -  [id number]   [facility] # - [de-identified]   - 1955      Date of Admission -  2023 10:34 AM  Date of evaluation -  2023  Room - 8A--DURGA Grafclark Viera MD Primary Care Physician - James Gamboa, APRN - CNP     Problem List      Active Hospital Problems    Diagnosis Date Noted    Severe malnutrition ([address]) Onita Foster 2023    Lung cancer metastatic to bone ([address]) [C34.90, C79.51] 2023    Cancer related pain [G89.3] 2023    Palliative care patient [Z51.5] 2023    Chronic cough [R05.3] 2023    Pleural effusion on left [J90] 2023    Acute respiratory failure with hypoxia [facility]) [J96.01] 2023    Malignant neoplasm of upper lobe of left lung [facility]) [C34.12] 2023     Reason for Consult    Stage IV lung cancer, acute resp failure with hypoxia  HPI   Jason Reagan is a 77 y.o. male admitted for acute respiratory failure. He was found to be hypoxic and specials when he was undergoing a Mediport placement. He required 2 L during the procedure. He went to recovery and was noted to be hypoxic and his room air pulse ox was less than 87%. The oncology office was contacted and recommended he be sent to the emergency room for evaluation. At the time of his arrival in the emergency department he is found be afebrile with an SpO2 of 88% on room air. He underwent a CT angiogram which demonstrated the left pulmonary artery being encased by progression of a left hilar and left perihilar consolidation. He has known stage IV lung cancer. He had shortness of breath with exertion. He send occasional cough. His wife states that he did have a low-grade fever prior to admission. He underwent a thoracentesis yesterday and approximately 700 mL of fluid was removed. He follow with Dr. Rosa Chandler and Sandy Quinonez in our office.     Past 24 hrs   -Just returned from first per [facility]'s criteria, possibly malignant vs parapneumonic vs less likely hypoalbuminemia  Will await final cytology result  -Suspected COPD pending full PFT  -Personal history of tobacco use   Recommendations   -Seen by Radiation Oncology, underwent targeting 12/12/2023  -Continue zosyn 7 day course will convert to orals at time of discharge  -No growth from pleural fluid at this time- continue to follow  -Azithromycin completed 12/10  -Continue stiolto daily  -Wean O2 to keep sats 89- 92%  -Pulmonary hygiene IS and acapella   -Palliative care following   -DVT prophylaxis : Lovenox  -Will plan for outpatient PFT in approx 4-6 weeks    Case discussed with nurse and patient/family. Questions and concerns addressed. Electronically signed by   KILLIAN Hurtado - CNP on 12/12/2023 at 1:58 PM    Addendum by Dr. Baljinder Goff MD:  Patient seen by me independently including key components of medical care. Face to face evaluation and examination was performed. Case discussed with Mr. Ag Sam CNP. Agree with Certified nurse practitioner's findings and plan as documented in the Certified nurse practitioner's note. Italicized font, if present,  represents changes to the note made by me. More than 50% of the encounter time involved with patient care by the Pulmonary & Critical care service team spent by me. Please see my modifications mentioned below:  He is on 2 L of oxygen on nasal cannula  Not in any distress  Radiation oncology input appreciated by Ms. Eboni Delcid PA-C. Medical oncology input appreciated by Ms. Jessica Brown CNP  I spoke with the patient and patient wife at bedside and informed about my impression and plan. All questions were answered.     Electronically signed by   Zahra Judd MD on 12/12/2023 at 8:11 PM

## 2023-12-13 ENCOUNTER — HOSPITAL ENCOUNTER (OUTPATIENT)
Dept: RADIATION ONCOLOGY | Age: 68
Discharge: HOME | End: 2023-12-13
Payer: MEDICARE

## 2023-12-13 LAB
ANION GAP SERPL CALC-SCNC: 8 MEQ/L (ref 8–16)
BASOPHILS ABSOLUTE: 0 THOU/MM3 (ref 0–0.1)
BASOPHILS NFR BLD AUTO: 0.2 %
BUN SERPL-MCNC: 21 MG/DL (ref 7–22)
CALCIUM SERPL-MCNC: 11 MG/DL (ref 8.5–10.5)
CHLORIDE SERPL-SCNC: 100 MEQ/L (ref 98–111)
CO2 SERPL-SCNC: 34 MEQ/L (ref 23–33)
CREAT SERPL-MCNC: 0.5 MG/DL (ref 0.4–1.2)
DEPRECATED RDW RBC AUTO: 58.8 FL (ref 35–45)
EOSINOPHIL NFR BLD AUTO: 0.1 %
EOSINOPHILS ABSOLUTE: 0 THOU/MM3 (ref 0–0.4)
ERYTHROCYTE [DISTWIDTH] IN BLOOD BY AUTOMATED COUNT: 19.3 % (ref 11.5–14.5)
GFR SERPL CREATININE-BSD FRML MDRD: > 60 ML/MIN/1.73M2
GLUCOSE SERPL-MCNC: 94 MG/DL (ref 70–108)
HCT VFR BLD AUTO: 25.6 % (ref 42–52)
HCT VFR BLD AUTO: 27.2 % (ref 42–52)
HGB BLD-MCNC: 7.1 GM/DL (ref 14–18)
HGB BLD-MCNC: 7.4 GM/DL (ref 14–18)
HYPOCHROMIA BLD QL SMEAR: PRESENT
IMM GRANULOCYTES # BLD AUTO: 0.22 THOU/MM3 (ref 0–0.07)
IMM GRANULOCYTES NFR BLD AUTO: 1 %
LYMPHOCYTES ABSOLUTE: 1.4 THOU/MM3 (ref 1–4.8)
LYMPHOCYTES NFR BLD AUTO: 6.2 %
MCH RBC QN AUTO: 23.3 PG (ref 26–33)
MCHC RBC AUTO-ENTMCNC: 27.7 GM/DL (ref 32.2–35.5)
MCV RBC AUTO: 83.9 FL (ref 80–94)
MONOCYTES ABSOLUTE: 1.6 THOU/MM3 (ref 0.4–1.3)
MONOCYTES NFR BLD AUTO: 6.8 %
NEUTROPHILS NFR BLD AUTO: 85.7 %
NRBC BLD AUTO-RTO: 0 /100 WBC
PLATELET # BLD AUTO: 453 THOU/MM3 (ref 130–400)
PMV BLD AUTO: 9 FL (ref 9.4–12.4)
POTASSIUM SERPL-SCNC: 4.2 MEQ/L (ref 3.5–5.2)
RBC # BLD AUTO: 3.05 MILL/MM3 (ref 4.7–6.1)
SEGMENTED NEUTROPHILS ABSOLUTE COUNT: 19.6 THOU/MM3 (ref 1.8–7.7)
SODIUM SERPL-SCNC: 142 MEQ/L (ref 135–145)
WBC # BLD AUTO: 22.9 THOU/MM3 (ref 4.8–10.8)

## 2023-12-13 PROCEDURE — 2580000003 HC RX 258: Performed by: INTERNAL MEDICINE

## 2023-12-13 PROCEDURE — 77295 3-D RADIOTHERAPY PLAN: CPT | Performed by: RADIOLOGY

## 2023-12-13 PROCEDURE — 6360000002 HC RX W HCPCS: Performed by: INTERNAL MEDICINE

## 2023-12-13 PROCEDURE — 6370000000 HC RX 637 (ALT 250 FOR IP): Performed by: STUDENT IN AN ORGANIZED HEALTH CARE EDUCATION/TRAINING PROGRAM

## 2023-12-13 PROCEDURE — 2700000000 HC OXYGEN THERAPY PER DAY

## 2023-12-13 PROCEDURE — 94761 N-INVAS EAR/PLS OXIMETRY MLT: CPT

## 2023-12-13 PROCEDURE — 85014 HEMATOCRIT: CPT

## 2023-12-13 PROCEDURE — 97535 SELF CARE MNGMENT TRAINING: CPT

## 2023-12-13 PROCEDURE — 2580000003 HC RX 258: Performed by: STUDENT IN AN ORGANIZED HEALTH CARE EDUCATION/TRAINING PROGRAM

## 2023-12-13 PROCEDURE — 77417 THER RADIOLOGY PORT IMAGE(S): CPT | Performed by: RADIOLOGY

## 2023-12-13 PROCEDURE — 77280 THER RAD SIMULAJ FIELD SMPL: CPT | Performed by: RADIOLOGY

## 2023-12-13 PROCEDURE — 77334 RADIATION TREATMENT AID(S): CPT | Performed by: RADIOLOGY

## 2023-12-13 PROCEDURE — 99233 SBSQ HOSP IP/OBS HIGH 50: CPT | Performed by: STUDENT IN AN ORGANIZED HEALTH CARE EDUCATION/TRAINING PROGRAM

## 2023-12-13 PROCEDURE — 99232 SBSQ HOSP IP/OBS MODERATE 35: CPT | Performed by: INTERNAL MEDICINE

## 2023-12-13 PROCEDURE — 77300 RADIATION THERAPY DOSE PLAN: CPT | Performed by: RADIOLOGY

## 2023-12-13 PROCEDURE — 1200000003 HC TELEMETRY R&B

## 2023-12-13 PROCEDURE — 94640 AIRWAY INHALATION TREATMENT: CPT

## 2023-12-13 PROCEDURE — 36415 COLL VENOUS BLD VENIPUNCTURE: CPT

## 2023-12-13 PROCEDURE — 6370000000 HC RX 637 (ALT 250 FOR IP)

## 2023-12-13 PROCEDURE — 97166 OT EVAL MOD COMPLEX 45 MIN: CPT

## 2023-12-13 PROCEDURE — 97530 THERAPEUTIC ACTIVITIES: CPT

## 2023-12-13 PROCEDURE — 77412 RADIATION TX DELIVERY LVL 3: CPT | Performed by: RADIOLOGY

## 2023-12-13 PROCEDURE — 80048 BASIC METABOLIC PNL TOTAL CA: CPT

## 2023-12-13 PROCEDURE — 85025 COMPLETE CBC W/AUTO DIFF WBC: CPT

## 2023-12-13 PROCEDURE — 99233 SBSQ HOSP IP/OBS HIGH 50: CPT | Performed by: NURSE PRACTITIONER

## 2023-12-13 PROCEDURE — 85018 HEMOGLOBIN: CPT

## 2023-12-13 RX ORDER — DRONABINOL 2.5 MG/1
2.5 CAPSULE ORAL 2 TIMES DAILY
Status: DISCONTINUED | OUTPATIENT
Start: 2023-12-13 | End: 2023-12-14 | Stop reason: HOSPADM

## 2023-12-13 RX ORDER — SENNOSIDES A AND B 8.6 MG/1
2 TABLET, FILM COATED ORAL NIGHTLY
Status: DISCONTINUED | OUTPATIENT
Start: 2023-12-14 | End: 2023-12-14 | Stop reason: HOSPADM

## 2023-12-13 RX ORDER — OXYCODONE HYDROCHLORIDE 5 MG/1
5 TABLET ORAL
Status: DISCONTINUED | OUTPATIENT
Start: 2023-12-13 | End: 2023-12-14 | Stop reason: HOSPADM

## 2023-12-13 RX ORDER — GABAPENTIN 100 MG/1
200 CAPSULE ORAL 3 TIMES DAILY
Status: DISCONTINUED | OUTPATIENT
Start: 2023-12-13 | End: 2023-12-14 | Stop reason: HOSPADM

## 2023-12-13 RX ADMIN — MIRTAZAPINE 7.5 MG: 7.5 TABLET ORAL at 21:14

## 2023-12-13 RX ADMIN — PIPERACILLIN AND TAZOBACTAM 3375 MG: 3; .375 INJECTION, POWDER, LYOPHILIZED, FOR SOLUTION INTRAVENOUS at 21:12

## 2023-12-13 RX ADMIN — ATORVASTATIN CALCIUM 5 MG: 10 TABLET, FILM COATED ORAL at 21:13

## 2023-12-13 RX ADMIN — GABAPENTIN 200 MG: 100 CAPSULE ORAL at 10:42

## 2023-12-13 RX ADMIN — BISACODYL 10 MG: 10 SUPPOSITORY RECTAL at 10:43

## 2023-12-13 RX ADMIN — METOPROLOL SUCCINATE 25 MG: 25 TABLET, EXTENDED RELEASE ORAL at 13:06

## 2023-12-13 RX ADMIN — BENZONATATE 100 MG: 100 CAPSULE ORAL at 18:06

## 2023-12-13 RX ADMIN — POLYETHYLENE GLYCOL 3350 17 G: 17 POWDER, FOR SOLUTION ORAL at 10:43

## 2023-12-13 RX ADMIN — PIPERACILLIN AND TAZOBACTAM 3375 MG: 3; .375 INJECTION, POWDER, LYOPHILIZED, FOR SOLUTION INTRAVENOUS at 13:12

## 2023-12-13 RX ADMIN — FOLIC ACID 1 MG: 1 TABLET ORAL at 13:06

## 2023-12-13 RX ADMIN — SODIUM CHLORIDE, PRESERVATIVE FREE 10 ML: 5 INJECTION INTRAVENOUS at 10:45

## 2023-12-13 RX ADMIN — TIOTROPIUM BROMIDE AND OLODATEROL 2 PUFF: 3.124; 2.736 SPRAY, METERED RESPIRATORY (INHALATION) at 08:09

## 2023-12-13 RX ADMIN — PIPERACILLIN AND TAZOBACTAM 3375 MG: 3; .375 INJECTION, POWDER, LYOPHILIZED, FOR SOLUTION INTRAVENOUS at 05:22

## 2023-12-13 RX ADMIN — DRONABINOL 2.5 MG: 2.5 CAPSULE ORAL at 10:47

## 2023-12-13 RX ADMIN — GABAPENTIN 200 MG: 100 CAPSULE ORAL at 20:16

## 2023-12-13 RX ADMIN — GABAPENTIN 200 MG: 100 CAPSULE ORAL at 13:07

## 2023-12-13 RX ADMIN — ACETAMINOPHEN 650 MG: 325 TABLET ORAL at 20:16

## 2023-12-13 ASSESSMENT — PAIN SCALES - GENERAL
PAINLEVEL_OUTOF10: 0
PAINLEVEL_OUTOF10: 0

## 2023-12-13 NOTE — PLAN OF CARE
Problem: Discharge Planning  Goal: Discharge to home or other facility with appropriate resources  Outcome: Progressing  Flowsheets (Taken 12/12/2023 0939 by Brady Merida RN)  Discharge to home or other facility with appropriate resources: Identify barriers to discharge with patient and caregiver     Problem: Respiratory - Adult  Goal: Achieves optimal ventilation and oxygenation  Outcome: Progressing  Flowsheets (Taken 12/11/2023 1945 by Mikayla Dupont RN)  Achieves optimal ventilation and oxygenation: Assess for changes in respiratory status     Problem: Cardiovascular - Adult  Goal: Maintains optimal cardiac output and hemodynamic stability  Outcome: Progressing  Flowsheets (Taken 12/11/2023 1945 by Mikayla Dupont RN)  Maintains optimal cardiac output and hemodynamic stability: Monitor blood pressure and heart rate  Goal: Absence of cardiac dysrhythmias or at baseline  Outcome: Progressing  Flowsheets (Taken 12/11/2023 1945 by Mikayla Dupont RN)  Absence of cardiac dysrhythmias or at baseline: Monitor cardiac rate and rhythm     Problem: Safety - Adult  Goal: Free from fall injury  Outcome: Progressing  Flowsheets (Taken 12/9/2023 1249 by George Cavanaugh RN)  Free From Fall Injury:   Instruct family/caregiver on patient safety   Based on caregiver fall risk screen, instruct family/caregiver to ask for assistance with transferring infant if caregiver noted to have fall risk factors     Problem: Skin/Tissue Integrity  Goal: Absence of new skin breakdown  Description: 1. Monitor for areas of redness and/or skin breakdown  2. Assess vascular access sites hourly  3. Every 4-6 hours minimum:  Change oxygen saturation probe site  4. Every 4-6 hours:  If on nasal continuous positive airway pressure, respiratory therapy assess nares and determine need for appliance change or resting period.   Outcome: Progressing     Problem: Nutrition Deficit:  Goal: Optimize nutritional status  Outcome: Progressing  Flowsheets (Taken 12/11/2023 1511 by Delmar Dickson RD)  Nutrient intake appropriate for improving, restoring, or maintaining nutritional needs:   Assess nutritional status and recommend course of action   Monitor oral intake, labs, and treatment plans   Recommend appropriate diets, oral nutritional supplements, and vitamin/mineral supplements   Provide specific nutrition education to patient or family as appropriate     Problem: Pain  Goal: Verbalizes/displays adequate comfort level or baseline comfort level  Outcome: Progressing  Flowsheets (Taken 12/12/2023 214)  Verbalizes/displays adequate comfort level or baseline comfort level: Encourage patient to monitor pain and request assistance

## 2023-12-13 NOTE — PROGRESS NOTES
Treatment # 1 of 2 given today. Total dose to date 400 cGy of planned dose 800 cGy. Patient tolerated treatment fine. No other issues.

## 2023-12-13 NOTE — CONSENT
Informed Consent for Blood Component Transfusion Note    I have discussed with the patient and wife the rationale for blood component transfusion; its benefits in treating or preventing fatigue, organ damage, or death; and its risk which includes mild transfusion reactions, rare risk of blood borne infection, or more serious but rare reactions. I have discussed the alternatives to transfusion, including the risk and consequences of not receiving transfusion. The patient and wife had an opportunity to ask questions and had agreed to proceed with transfusion of blood components.     Electronically signed by KILLIAN Mckeon CNP on 12/13/23 at 10:01 AM EST

## 2023-12-13 NOTE — PROGRESS NOTES
Occupational Therapy    Elder Shellie requires the assistance of a rolling walker to successfully complete daily living tasks such as: bathing, toileting, dressing and grooming. A standard walker is necessary due to the patient's impaired ambulation, balance, and mobility restrictions, and can ambulate only by using a walker instead of a lesser assistive device, such as a cane or crutch.       Electronically signed by Aurea yAon OT on 12/13/2023 at 8:51 AM

## 2023-12-13 NOTE — PROGRESS NOTES
[facility]  INPATIENT OCCUPATIONAL THERAPY  [facility] MED SURG 8A  EVALUATION    Time:   Time In: 3760  Time Out: 2451  Timed Code Treatment Minutes: 48 Minutes  Minutes: 63          Date: 2023  Patient Name: Abhinav Leong,   Gender: male      MRN: [id number]  : 1955  (77 y.o.)  Referring Practitioner: Hyun Ross MD  Diagnosis: Acute respiratory failure with hypoxia  Additional Pertinent Hx: Pt admitted through specialist's office s/p mediport placement with decreased O2 sats level and hypoxia. Hx: metastatic adenocancer of lung. s/p thoracentesis Lt due to pleural effusion. Pt currently undergoing radiation for the CA    Restrictions/Precautions:  Restrictions/Precautions: General Precautions, Fall Risk    Subjective  Chart Reviewed: Yes, Orders, Progress Notes  Patient assessed for rehabilitation services?: Yes    Subjective: RN okayed session. Pt was resting in bed upon arrival, pleasant and agreeable to OT. Pain: Denies pain    Vitals: Oxygen: Pt on 2L O2 upon arrival, sats >92% while resting in bed. Dropping to 83% with mobility, with difficulty recovering upon being seated with cues for deep breathing and pursed lip breathing. Titrated O2 to 3L, able to recover to 90%, but difficulty maintaining at lower than 3L. Pt left on 3L at this time with explanation provided to pt and spouse. RN notified.      Social/Functional History:  Lives With: Spouse  Type of Home: House  Home Layout: One level  Home Access: Stairs to enter with rails  Entrance Stairs - Number of Steps: 2  Entrance Stairs - Rails: Left  Home Equipment:  (None used PTA.)   Bathroom Shower/Tub: Walk-in shower  Bathroom Equipment: Shower chair, Toilet raiser  Bathroom Accessibility: Accessible       ADL Assistance: Independent  Homemaking Assistance: Independent  Ambulation Assistance: Independent  Transfer Assistance: Independent    Active : Yes          VISION:Corrected    HEARING:  WFL    COGNITION: Slow

## 2023-12-13 NOTE — PROGRESS NOTES
Pt is a 68y. o. male, sleeping soundly, in 8A-03; pt spouse was bedside reading.  Please refer to ACP note for additional context, re: AD's.     12/13/23 1517   Encounter Summary   Encounter Overview/Reason  Advance Care Planning   Service Provided For: Patient and family together   Referral/Consult From: Multi-disciplinary team   Support System Spouse   Last Encounter  12/13/23   Complexity of Encounter Low   Begin Time 1328   End Time  1330   Total Time Calculated 2 min   Advance Care Planning   Type   (Provided full, blank set of AD's for patient and spouse review)   Assessment/Intervention/Outcome   Assessment Unable to assess;Calm;Coping;Peaceful   Intervention Explored/Affirmed feelings, thoughts, concerns   Outcome Expressed Gratitude

## 2023-12-13 NOTE — ACP (ADVANCE CARE PLANNING)
Advance Care Planning     Advance Care Planning Inpatient Note  Spiritual Care Department    Today's Date: 12/13/2023  Unit: [facility] MED SURG 8A    Received request from IDT Member. Upon review of chart and communication with care team, Spiritual Care will defer advance care planning with patient at this time. . Patient and Spouse was/were present in the room during visit. Goals of ACP Conversation:  Discuss advance care planning documents  Provide full, blank set of AD's for patient and spouse study. Health Care Decision Makers:     No healthcare decision makers have been documented. Click here to complete Santana including selection of the Healthcare Decision Maker Relationship (ie \"Primary\")  Summary:  No Decision Maker named by patient at this time    Advance Care Planning Documents (Patient Wishes):  Healthcare Power of /Advance Directive Appointment of [address] [place]  Living Will/Advance Directive     Assessment:  Pt is a 68y. o. male, sleeping soundly, in 8A-03. Pt spouse is reading, bedside.  briefly explained consult entered and confirmed their interest in AD's. Left a full copy for their review and study, once pt is awake and ready to discuss-pt spouse expressed gratitude for the brief visit. Interventions:  Encouraged ongoing ACP conversation with future decision makers and loved ones  Provided full, blank set of AD's for pt and spouse to review and study. Care Preferences Communicated:   No    Outcomes/Plan:  Pt/spouse will let spiritual health know if/when ready to complete.     Electronically signed by Loli Jacobsen on 12/13/2023 at 3:19 PM

## 2023-12-13 NOTE — PROGRESS NOTES
hemithorax compared to the right. The right lung is clear. There is no right-sided pleural effusion. The   pulmonary vascularity is normal.  IMPRESSION:  Worsening aeration of the left lung. This is likely due to a combination of pleural fluid and atelectasis/collapse. Pneumonia is also a consideration. CXR  12/09/23     IMPRESSION:  1. Normal heart size. No pleural effusion seen. No evidence for pneumothorax following recent thoracentesis left side. 2. MediPort right side, catheter tip in right atrium. 3. Relatively diffuse infiltrative process in the left lung which has worsened since prior study, consistent with the known infiltrative left lung mass and probably some adjacent atelectasis/pneumonia. Overall appearance is worsened since prior study. CT Scans 12/8/23     IMPRESSION:  1. No filling defects are noted within the pulmonary arterial vasculature to suggest the presence of pulmonary embolus however the left main pulmonary artery is encased by progression of the left hilar lymphadenopathy and left perihilar/left upper lobe   consolidation which has markedly worsened since the prior study. All of the mediastinal, hilar, and axillary lymph nodes have increased in size. Many no enlarged lymph nodes are noted at the thoracic inlet and in the left thyroid fossa. Some of the   prevascular lymph nodes as well as lymph nodes which are noted at the thoracic inlet appear necrotic. 2. New moderate left pleural effusion and left lower lobe consolidation. Worsening left pleural thickening and heterogeneously enhancing consolidative lung in the lingula suspicious for pleural and parenchymal metastasis. Diffuse patchy bilateral upper   and lower lobe groundglass opacity are also noted possibly pulmonary edema although airspace infection/inflammation are differential considerations. No pneumothorax is observed.      (See actual reports for details)    Assessment   -Acute hypoxic respiratory failure-currently on nasal cannula oxygen at 2 L  -Likely secondary to postobstructive pneumonia and stage IV lung cancer  -Stage IV adenocarcinoma lung cancer- underwent EBUS 10/30/2023 by Dr. West Evans following with [facility]  -Left perihilar LAD/lung mass with Postobstructive pneumonia-currently on zosyn  -Pleural effusion-status post thoracentesis 12/8/2023 700 cc out exudative per Light's criteria, possibly malignant vs parapneumonic vs less likely hypoalbuminemia  Will await final cytology result  -Suspected COPD pending full PFT  -Personal history of tobacco use   Recommendations   -Seen by Radiation Oncology, underwent targeting 12/12/2023 and first radiation tx 12/13/23  -Continue zosyn 7 day course will convert to oral equivalent at time of discharge  -No growth from pleural fluid at this time- continue to follow  -Azithromycin completed 12/10  -Continue stiolto daily  -Wean O2 to keep sats 89- 92%  -Pulmonary hygiene IS and acapella   -Palliative care following   -DVT prophylaxis : Lovenox  -Will plan for outpatient PFT and repeat imaging in approx 6-8 weeks    Case discussed with nurse and patient/family. Questions and concerns addressed. Electronically signed by   KILLIAN Scherer CNP on 12/13/2023 at 1:36 PM    Addendum by Dr. Nasrin Napier MD:  Patient seen by me independently including key components of medical care. Face to face evaluation and examination was performed. Case discussed with Mr. Patrick Ha CNP. Agree with Certified nurse practitioner's findings and plan as documented in the Certified nurse practitioner's note. Italicized font, if present,  represents changes to the note made by me. More than 50% of the encounter time involved with patient care by the Pulmonary & Critical care service team spent by me.     Please see my modifications mentioned below:  He is on a 2 L of oxygen nasal cannula  Not in any distress  For radiation therapy today  I spoke with the patient and patient wife at bedside and informed about my impression plan. All questions were answered.   All cultures were negative so far  Follow-up as above    Electronically signed by   Irasema Haji MD on 12/13/2023 at 7:23 PM

## 2023-12-13 NOTE — PROGRESS NOTES
Hospitalist Progress Note    Patient:  Eddie Erwin      Unit/Bed:8A-03/003-A    YOB: 1955    MRN: [id number]       Acct: [de-identified]     PCP: KILLIAN Jaime CNP    Date of Admission: 12/8/2023    Assessment/Plan:    Acute hypoxic respiratory failure, likely secondary to postobstructive pneumonia, pleural effusion and stage IV lung cancer--on 2 L oxygen satting 93 to 94%; will need home oxygen evaluation  Left-sided pleural effusion status post thoracentesis on 12/9/2023 with 700 mL drained--appreciate pulmonology input, exudative per lights criteria, possibly malignant versus parapneumonic  Malignant neoplasm of left lung with possible metastasis to pancreatic head, lytic lesion left lateral fourth rib--appreciate oncology input, appreciate radiation oncology input; underwent targeting by radiation oncology on 12/12; planning palliative radiation oncology today and tomorrow, I discussed with Merritt Craft from oncology and planning possible discharge on Friday 12/15  Acute blood loss anemia--discussed with oncology/hematology and plan to transfuse if hemoglobin less than 7, will monitor, patient and spouse consented to blood transfusion if needed on 12/13  Leukocytosis--white count has increased, is afebrile  Thrombocytosis--platelets have increased  Elevated CO2--mild, monitor  Hypercalcemia--likely secondary to #3, monitor  Severe malnutrition--per dietitian; was started on Marinol  Primary hypertension, uncontrolled--  Possible COPD--will need outpatient PFTs, currently requiring oxygen; on Stiolto  Anemia of chronic disease--please see #4  Hyperlipidemia--on statin  Remote tobacco abuse history  Physical deconditioning/debility--likely secondary to metastatic cancer  CODE STATUS--limited x 4; palliative care on the case      Expected discharge date: Pending clinical course    Disposition:    [x] Home       [] TCU       [] Rehab       [] Psych       [] SNF       [] [facility] [address] [facility] [address] [place] [address] respiratory effort. Diminished to auscultation, bilaterally without Rales/Wheezes/Rhonchi. Cardiovascular: Regular rate and rhythm with normal S1/S2 without murmurs, rubs or gallops. Abdomen: Soft, non-tender, non-distended with normal bowel sounds. Musculoskeletal: passive and active ROM x 4 extremities. Skin: Skin color, texture, turgor normal.    Neurologic:  Neurovascularly intact without any focal sensory/motor deficits. Cranial nerves: II-XII intact, grossly non-focal.  Psychiatric: Alert and oriented, thought content appropriate  Capillary Refill: Brisk,< 3 seconds   Peripheral Pulses: +2 palpable, equal bilaterally       Labs:   Recent Labs     12/11/23 0527 12/13/23  0526   WBC 19.3* 22.9*   HGB 7.4* 7.1*   HCT 26.8* 25.6*   * 453*     Recent Labs     12/13/23 0526      K 4.2      CO2 34*   BUN 21   CREATININE 0.5   CALCIUM 11.0*     Microbiology: Body fluid culture: Moderate segmented neutrophils observed, no bacteria  COVID-19 not detected  Influenza not detected    Radiology:  CT GUIDE RADIATION THERAPY NO CHARGE    Result Date: 12/12/2023  Radiology exam is complete. No Radiologist dictation. Please follow up with ordering provider.        DVT prophylaxis: [] Lovenox~hold                                  [] SCDs                                 [] SQ Heparin                                 [] Encourage ambulation           [] Already on Anticoagulation     Code Status: Limited    PT/OT Eval Status: Ordered    Tele:   [] Yes              [x] no    Active Hospital Problems    Diagnosis Date Noted    Leukocytosis [D72.829] 12/12/2023    Thrombocytosis [D75.839] 12/12/2023    Severe malnutrition ([address]) [E43] 12/11/2023    Lung cancer metastatic to bone ([address]) [C34.90, C79.51] 12/11/2023    Cancer related pain [G89.3] 12/11/2023    Palliative care patient [Z51.5] 12/11/2023    Chronic cough [R05.3] 12/11/2023    Pleural effusion on left [J90] 12/11/2023    Acute respiratory failure

## 2023-12-14 ENCOUNTER — HOSPITAL ENCOUNTER (OUTPATIENT)
Dept: RADIATION ONCOLOGY | Age: 68
Discharge: HOME | End: 2023-12-14
Payer: MEDICARE

## 2023-12-14 VITALS
BODY MASS INDEX: 19.91 KG/M2 | WEIGHT: 142.2 LBS | TEMPERATURE: 98 F | DIASTOLIC BLOOD PRESSURE: 67 MMHG | RESPIRATION RATE: 20 BRPM | SYSTOLIC BLOOD PRESSURE: 124 MMHG | OXYGEN SATURATION: 90 % | HEIGHT: 71 IN | HEART RATE: 100 BPM

## 2023-12-14 VITALS
TEMPERATURE: 97.4 F | WEIGHT: 142.2 LBS | OXYGEN SATURATION: 97 % | SYSTOLIC BLOOD PRESSURE: 109 MMHG | HEART RATE: 79 BPM | RESPIRATION RATE: 20 BRPM | DIASTOLIC BLOOD PRESSURE: 59 MMHG | BODY MASS INDEX: 19.83 KG/M2

## 2023-12-14 LAB
ANION GAP SERPL CALC-SCNC: 3 MEQ/L (ref 8–16)
BUN SERPL-MCNC: 26 MG/DL (ref 7–22)
CALCIUM SERPL-MCNC: 11.3 MG/DL (ref 8.5–10.5)
CHLORIDE SERPL-SCNC: 101 MEQ/L (ref 98–111)
CO2 SERPL-SCNC: 38 MEQ/L (ref 23–33)
CREAT SERPL-MCNC: 0.6 MG/DL (ref 0.4–1.2)
DEPRECATED RDW RBC AUTO: 59.6 FL (ref 35–45)
ERYTHROCYTE [DISTWIDTH] IN BLOOD BY AUTOMATED COUNT: 19.3 % (ref 11.5–14.5)
GFR SERPL CREATININE-BSD FRML MDRD: > 60 ML/MIN/1.73M2
GLUCOSE SERPL-MCNC: 114 MG/DL (ref 70–108)
HCT VFR BLD AUTO: 26.1 % (ref 42–52)
HGB BLD-MCNC: 7.1 GM/DL (ref 14–18)
MCH RBC QN AUTO: 23 PG (ref 26–33)
MCHC RBC AUTO-ENTMCNC: 27.2 GM/DL (ref 32.2–35.5)
MCV RBC AUTO: 84.5 FL (ref 80–94)
PLATELET # BLD AUTO: 480 THOU/MM3 (ref 130–400)
PMV BLD AUTO: 9.2 FL (ref 9.4–12.4)
POTASSIUM SERPL-SCNC: 4.5 MEQ/L (ref 3.5–5.2)
RBC # BLD AUTO: 3.09 MILL/MM3 (ref 4.7–6.1)
SODIUM SERPL-SCNC: 142 MEQ/L (ref 135–145)
WBC # BLD AUTO: 23.2 THOU/MM3 (ref 4.8–10.8)

## 2023-12-14 PROCEDURE — 36415 COLL VENOUS BLD VENIPUNCTURE: CPT

## 2023-12-14 PROCEDURE — 77412 RADIATION TX DELIVERY LVL 3: CPT | Performed by: RADIOLOGY

## 2023-12-14 PROCEDURE — 94640 AIRWAY INHALATION TREATMENT: CPT

## 2023-12-14 PROCEDURE — 77387 GUIDANCE FOR RADJ TX DLVR: CPT | Performed by: RADIOLOGY

## 2023-12-14 PROCEDURE — 6360000002 HC RX W HCPCS: Performed by: INTERNAL MEDICINE

## 2023-12-14 PROCEDURE — 6370000000 HC RX 637 (ALT 250 FOR IP): Performed by: STUDENT IN AN ORGANIZED HEALTH CARE EDUCATION/TRAINING PROGRAM

## 2023-12-14 PROCEDURE — 94761 N-INVAS EAR/PLS OXIMETRY MLT: CPT

## 2023-12-14 PROCEDURE — 80048 BASIC METABOLIC PNL TOTAL CA: CPT

## 2023-12-14 PROCEDURE — 99239 HOSP IP/OBS DSCHRG MGMT >30: CPT | Performed by: NURSE PRACTITIONER

## 2023-12-14 PROCEDURE — 99233 SBSQ HOSP IP/OBS HIGH 50: CPT | Performed by: STUDENT IN AN ORGANIZED HEALTH CARE EDUCATION/TRAINING PROGRAM

## 2023-12-14 PROCEDURE — NBSRV NON-BILLABLE SERVICE: Performed by: NURSE PRACTITIONER

## 2023-12-14 PROCEDURE — 85027 COMPLETE CBC AUTOMATED: CPT

## 2023-12-14 PROCEDURE — 2580000003 HC RX 258: Performed by: INTERNAL MEDICINE

## 2023-12-14 PROCEDURE — 99232 SBSQ HOSP IP/OBS MODERATE 35: CPT | Performed by: INTERNAL MEDICINE

## 2023-12-14 PROCEDURE — 2700000000 HC OXYGEN THERAPY PER DAY

## 2023-12-14 RX ORDER — DRONABINOL 2.5 MG/1
2.5 CAPSULE ORAL 2 TIMES DAILY
Qty: 60 CAPSULE | Refills: 0 | Status: SHIPPED | OUTPATIENT
Start: 2023-12-14 | End: 2023-12-22

## 2023-12-14 RX ORDER — POLYETHYLENE GLYCOL 3350 17 G/17G
17 POWDER, FOR SOLUTION ORAL 2 TIMES DAILY
Qty: 527 G | Refills: 1 | Status: SHIPPED | OUTPATIENT
Start: 2023-12-14 | End: 2023-12-22

## 2023-12-14 RX ORDER — SENNOSIDES A AND B 8.6 MG/1
2 TABLET, FILM COATED ORAL NIGHTLY
Qty: 60 TABLET | Refills: 0 | Status: SHIPPED | OUTPATIENT
Start: 2023-12-14 | End: 2023-12-22

## 2023-12-14 RX ORDER — AMOXICILLIN AND CLAVULANATE POTASSIUM 875; 125 MG/1; MG/1
1 TABLET, FILM COATED ORAL EVERY 12 HOURS SCHEDULED
Status: DISCONTINUED | OUTPATIENT
Start: 2023-12-14 | End: 2023-12-14 | Stop reason: HOSPADM

## 2023-12-14 RX ORDER — AMOXICILLIN AND CLAVULANATE POTASSIUM 875; 125 MG/1; MG/1
1 TABLET, FILM COATED ORAL EVERY 12 HOURS SCHEDULED
Qty: 10 TABLET | Refills: 0 | Status: SHIPPED | OUTPATIENT
Start: 2023-12-14 | End: 2023-12-19

## 2023-12-14 RX ORDER — GABAPENTIN 100 MG/1
200 CAPSULE ORAL 3 TIMES DAILY
Qty: 90 CAPSULE | Refills: 1 | Status: SHIPPED | OUTPATIENT
Start: 2023-12-14 | End: 2023-12-22

## 2023-12-14 RX ORDER — OXYCODONE HYDROCHLORIDE 5 MG/1
5 TABLET ORAL EVERY 6 HOURS PRN
Qty: 20 TABLET | Refills: 0 | Status: SHIPPED | OUTPATIENT
Start: 2023-12-14 | End: 2023-12-20

## 2023-12-14 RX ADMIN — FOLIC ACID 1 MG: 1 TABLET ORAL at 09:13

## 2023-12-14 RX ADMIN — PIPERACILLIN AND TAZOBACTAM 3375 MG: 3; .375 INJECTION, POWDER, LYOPHILIZED, FOR SOLUTION INTRAVENOUS at 05:09

## 2023-12-14 RX ADMIN — ALBUTEROL SULFATE 2 PUFF: 90 AEROSOL, METERED RESPIRATORY (INHALATION) at 10:25

## 2023-12-14 RX ADMIN — METOPROLOL SUCCINATE 25 MG: 25 TABLET, EXTENDED RELEASE ORAL at 09:13

## 2023-12-14 RX ADMIN — GABAPENTIN 200 MG: 100 CAPSULE ORAL at 14:52

## 2023-12-14 RX ADMIN — TIOTROPIUM BROMIDE AND OLODATEROL 2 PUFF: 3.124; 2.736 SPRAY, METERED RESPIRATORY (INHALATION) at 09:33

## 2023-12-14 RX ADMIN — GABAPENTIN 200 MG: 100 CAPSULE ORAL at 09:13

## 2023-12-14 NOTE — PLAN OF CARE
Problem: Discharge Planning  Goal: Discharge to home or other facility with appropriate resources  Outcome: Progressing  Flowsheets (Taken 12/13/2023 1915)  Discharge to home or other facility with appropriate resources: Identify barriers to discharge with patient and caregiver     Problem: Respiratory - Adult  Goal: Achieves optimal ventilation and oxygenation  Outcome: Progressing  Flowsheets (Taken 12/13/2023 1915)  Achieves optimal ventilation and oxygenation: Assess for changes in respiratory status     Problem: Cardiovascular - Adult  Goal: Maintains optimal cardiac output and hemodynamic stability  Outcome: Progressing  Flowsheets (Taken 12/13/2023 1915)  Maintains optimal cardiac output and hemodynamic stability: Monitor blood pressure and heart rate  Goal: Absence of cardiac dysrhythmias or at baseline  Outcome: Progressing  Flowsheets (Taken 12/13/2023 1915)  Absence of cardiac dysrhythmias or at baseline: Monitor cardiac rate and rhythm     Problem: Safety - Adult  Goal: Free from fall injury  Outcome: Progressing     Problem: Skin/Tissue Integrity  Goal: Absence of new skin breakdown  Description: 1. Monitor for areas of redness and/or skin breakdown  2. Assess vascular access sites hourly  3. Every 4-6 hours minimum:  Change oxygen saturation probe site  4. Every 4-6 hours:  If on nasal continuous positive airway pressure, respiratory therapy assess nares and determine need for appliance change or resting period.   Outcome: Progressing     Problem: Nutrition Deficit:  Goal: Optimize nutritional status  Outcome: Progressing     Problem: Pain  Goal: Verbalizes/displays adequate comfort level or baseline comfort level  Outcome: Progressing

## 2023-12-14 NOTE — CARE COORDINATION
12/14/23, 1:06 PM EST    Patient goals/plan/ treatment preferences discussed by  and . Patient goals/plan/ treatment preferences reviewed with patient/ family. Patient/ family verbalize understanding of discharge plan and are in agreement with goal/plan/treatment preferences. Understanding was demonstrated using the teach back method. AVS provided by RN at time of discharge, which includes all necessary medical information pertaining to the patients current course of illness, treatment, post-discharge goals of care, and treatment preferences. Services At/After Discharge: Hospice and In ambulette       IMM Letter  IMM Letter given to Patient/Family/Significant other/Guardian/POA/by[de-identified] patient registration  IMM Letter date given[de-identified]  (discharge on hospice)  IMM Letter time given[de-identified] [phone]        Patient is discharging with hospice today. Transport is set for 3:30pm by [employer].

## 2023-12-14 NOTE — PROGRESS NOTES
Treatment # 2 of 2 given today. Total dose to date 800 cGy of planned dose 800 cGy. Patient tolerated treatment fine. No other issues.

## 2023-12-14 NOTE — PROGRESS NOTES
Hospice referral completed with Prem Hernandez and his wife Rebecca Hinds. Updated by attending provider Luis Rader CNP, patient and wife wish for him to go home today. Providers have talked with him today and he does not want to continue with any treatments but wants to spend the remainder of what he may have, at home. Hospice concepts, philosophies, and services explained with full referral complete. Agreeable to admission in home this date. Equipment ordered with DME bringing portable to room for transport home by ambulette with transport time set for 1530 by . Comfort scripts signed by Dr. Rafael Laureano, faxed to [facility], and hard scripts taken by hospice nurse Jossie Quiles RN. Attending had signed DNR-CC.  Primary nurse will fax AVS to hospice office and call hospice office when patient leaving hospital.   Qualification sheet will be completed by this nurse with review by hospice medical director Dr. Thelma Sprague.

## 2023-12-14 NOTE — PROGRESS NOTES
[facility] PHYSICIANS  [address] [place] [address]  [address]  [facility] [address]  Phone: [phone]  Fax: [phone]  Primary Provider: Dora Alas  Pre-op Performing Provider: YEE BAY      PREOPERATIVE EVALUATION:  Today's date: 2021    Paulette Griffin is a 62 year old female who presents for a preoperative evaluation.  ( 57)    Surgical Information:  Surgery/Procedure: Left total hip arthroplasty  Surgery Location: [facility]  Surgeon: Dr. Mike  Surgery Date: 21  Time of Surgery: PM  Where patient plans to recover: Staying overnight one night, then recovery at home   Fax number for surgical facility: [phone]    Type of Anesthesia Anticipated: to be determined    Assessment & Plan     The proposed surgical procedure is considered INTERMEDIATE risk.    Problem List Items Addressed This Visit        Other    Family history of clotting disorder    Relevant Orders    Oncology/Hematology Adult Referral      Other Visit Diagnoses     Hip pain, left    -  Primary    Relevant Medications    Naproxen Sodium (ALEVE PO)    Other Relevant Orders    NM Lexiscan stress test (nuc card) (Completed)    Radiology Referral    Preoperative examination        Relevant Orders    HEMOGRAM PLATELET DIFF (BFP) (Completed)    EKG 12-lead complete w/read - Clinics (Completed)    Basic Metabolic Panel (BFP) (Completed)    VENOUS COLLECTION (Completed)    Oncology/Hematology Adult Referral    NM Lexiscan stress test (nuc card) (Completed)    Radiology Referral    Adult Cardiology Eval Referral    Abnormal electrocardiogram        Relevant Orders    NM Lexiscan stress test (nuc card) (Completed)    Radiology Referral    Adult Cardiology Eval Referral          1. Preoperative examination    - HEMOGRAM PLATELET DIFF (BFP)  - EKG 12-lead complete w/read - Clinics  - Basic Metabolic Panel (BFP)  - VENOUS COLLECTION  - Oncology/Hematology Adult Referral; Future  - NM Lexiscan stress  [facility]    Patient - Dennys Reynaga   MRN -  [id number]   [facility]   - 1955      Date of Admission -  2023 10:34 AM  Date of evaluation -  2023  Room - 8A--A   [address] [place] [address]* Primary Care Physician - KILLIAN Mercer - CNP     Problem List      Active Hospital Problems    Diagnosis Date Noted    Leukocytosis [D72.829] 2023    Thrombocytosis [D75.839] 2023    Severe malnutrition ([address]) [E43] 2023    Lung cancer metastatic to bone ([address]) [C34.90, C79.51] 2023    Cancer related pain [G89.3] 2023    Palliative care patient [Z51.5] 2023    Chronic cough [R05.3] 2023    Pleural effusion on left [J90] 2023    Acute respiratory failure with hypoxia [facility]) [J96.01] 2023    Malignant neoplasm of upper lobe of left lung [facility]) [C34.12] 2023     Reason for Consult    Stage IV lung cancer, acute hypoxic resp failure  HPI   Dennys Reynaga is a 77 y.o. male admitted for acute respiratory failure. He was found to be hypoxic and specials when he was undergoing a Mediport placement. He required 2 L during the procedure. He went to recovery and was noted to be hypoxic and his room air pulse ox was less than 87%. The oncology office was contacted and recommended he be sent to the emergency room for evaluation. At the time of his arrival in the emergency department he is found be afebrile with an SpO2 of 88% on room air. He underwent a CT angiogram which demonstrated the left pulmonary artery being encased by progression of a left hilar and left perihilar consolidation. He has known stage IV lung cancer. He had shortness of breath with exertion. He send occasional cough. His wife states that he did have a low-grade fever prior to admission. He underwent a thoracentesis yesterday and approximately 700 mL of fluid was removed.   He follow with  "test (nuc card); Future  - Radiology Referral; Future  - Adult Cardiology Eval Referral; Future    2. Hip pain, left    - NM Lexiscan stress test (nuc card); Future  - Radiology Referral; Future    3. Family history of clotting disorder  Patient was referred to oncology. Per their recommendations she should take aspirin postop for 3-4 weeks to prevent DVT. No further recommendations for workup.  - Oncology/Hematology Adult Referral; Future    4. Abnormal electrocardiogram  \"Take aspirin post-op as per hematology recommendations.  No additional cardiovascular testing recommended.  Proceed with left hip arthoplasty as planned  Follow up approximately one month post-op with Anyi Gracia  to evaluate blood pressure.\"  Patient was referred to see cardiology to review lexiscan and ekg.    - NM Lexiscan stress test (nuc card); Future  - Radiology Referral; Future  - Adult Cardiology Eval Referral; Future    Risks and Recommendations:  The patient has the following additional risks and recommendations for perioperative complications:   - No identified additional risk factors other than previously addressed    Medication Instructions:  Patient is on no chronic medications    RECOMMENDATION:  APPROVAL GIVEN to proceed with proposed procedure, without further diagnostic evaluation.      Subjective     HPI related to upcoming procedure: here for preop for hip surgery, has extreme pinching in the left groin area, knotten hamstring all the time. Pain in the left butt and going around and to the knee and down. This has been since spring of this year but coming on since last fall. Last October noticed it when hiking.    1. No - Have you ever had a heart attack or stroke?  2. No - Have you ever had surgery on your heart or blood vessels, such as a stent, coronary (heart) bypass, or surgery on an artery in the head, neck, heart, or legs?  3. No - Do you have chest pain when you are physically active?  4. No - Do you have a " history of heart failure?  5. No - Do you currently have a cold, bronchitis, or symptoms of other respiratory (head and chest) infections?  6. No - Do you have a cough, shortness of breath, or wheezing?  7. Yes - Do you or anyone in your family have a history of blood clots? Mother had blood clots with ocps, daughter had blood clots with ocps  8. No - Do you or anyone in your family have a serious bleeding problem, such as long-lasting bleeding after surgeries or cuts?  9. Yes - Have you ever had anemia or been told to take iron pills? Anemia age 3, then with pregnancies, then around menopause  10. No - Have you had any abnormal blood loss such as black, tarry or bloody stools, or abnormal vaginal bleeding?  11. No - Have you ever had a blood transfusion?  12. Yes - Are you willing to have a blood transfusion if it is medically needed before, during, or after your surgery?  13. Yes - Have you or anyone in your family ever had problems with anesthesia (sedation for surgery)? Ill after tonsillectomy age1  14. No - Do you have sleep apnea, excessive snoring, or daytime drowsiness?   15. No - Do you have any artifical heart valves or other implanted medical devices, such as a pacemaker, defibrillator, or continuous glucose monitor?  16. No - Do you have any artifical joints?  17. No - Are you allergic to latex?  18. No - Is there any chance that you may be pregnant?    Health Care Directive:  Patient does not have a Health Care Directive or Living Will: Discussed advance care planning with patient; information given to patient to review.    Preoperative Review of :   reviewed - no record of controlled substances prescribed.          Review of Systems  CONSTITUTIONAL: NEGATIVE for fever, chills, change in weight  INTEGUMENTARY/SKIN: NEGATIVE for worrisome rashes, moles or lesions  EYES: NEGATIVE for vision changes or irritation  ENT/MOUTH: NEGATIVE for ear, mouth and throat problems  RESP: NEGATIVE for significant  cough or SOB  CV: NEGATIVE for chest pain, palpitations or peripheral edema  GI: NEGATIVE for nausea, abdominal pain, heartburn, or change in bowel habits  : NEGATIVE for frequency, dysuria, or hematuria  MUSCULOSKELETAL: NEGATIVE for significant arthralgias or myalgia  NEURO: NEGATIVE for weakness, dizziness or paresthesias  ENDOCRINE: NEGATIVE for temperature intolerance, skin/hair changes  HEME: NEGATIVE for bleeding problems  PSYCHIATRIC: NEGATIVE for changes in mood or affect    Patient Active Problem List    Diagnosis Date Noted     Family history of clotting disorder 11/02/2021     Priority: Medium     Health Care Home 10/10/2012     Priority: Medium     State Tier Level:  Tier 0  Status:  n/a  Care Coordinator:   See Letters for [facility] Care Plan             ACP (advance care planning) 10/10/2012     Priority: Medium     Advance Care Planning 3/16/2017: ACP Review of Chart / Resources Provided:  Reviewed chart for advance care plan.  Paulette Griffin has no plan or code status on file. Discussed available resources and provided with information. Confirmed code status reflects current choices pending further ACP discussions.  Confirmed/documented legally designated decision makers.  Added by Julissa Paredes                     Herpes simplex virus (HSV) infection 03/03/2005     Priority: Medium     Problem list name updated by automated process. Provider to review       Ovarian cyst 05/22/2003     Priority: Medium     Problem list name updated by automated process. Provider to review        No past medical history on file.  Past Surgical History:   Procedure Laterality Date     HC REMOVAL OF TONSILS,<13 Y/O      Tonsillectomy, under 12 yrs     HYSTEROSCOPY  07/01/2015    polyp     Current Outpatient Medications   Medication Sig Dispense Refill     Homeopathic Products (AVOCADO REVITALIZING EX)        MELATONIN PO Take by mouth nightly as needed       multivitamin w/minerals (MULTI-VITAMIN) tablet Take 1 tablet by  "mouth daily       Naproxen Sodium (ALEVE PO) Take by mouth 2 times daily (with meals)       TURMERIC PO          Allergies   Allergen Reactions     No Known Allergies         Social History     Tobacco Use     Smoking status: Former Smoker     Smokeless tobacco: Never Used     Tobacco comment: teenager   Substance Use Topics     Alcohol use: Yes     Alcohol/week: 1.0 standard drinks     Types: 1 Glasses of wine per week     Family History   Problem Relation Age of Onset     Cancer Maternal Aunt         bone     Cancer Paternal Aunt         primary site unknown     Diabetes Paternal Uncle      Diabetes Paternal Aunt      Heart Disease Father         cabg x 4     Alzheimer Disease No family hx of      History   Drug Use No         Objective     /82 (BP Location: Right arm, Patient Position: Sitting, Cuff Size: Adult Regular)   Pulse 71   Temp 98.5  F (36.9  C) (Temporal)   Ht 1.702 m (5' 7\")   Wt 84.6 kg (186 lb 9.6 oz)   LMP 08/31/2012   SpO2 98%   BMI 29.23 kg/m      Physical Exam    GENERAL APPEARANCE: healthy, alert and no distress     EYES: EOMI, PERRL     HENT: ear canals and TM's normal and nose and mouth without ulcers or lesions     NECK: no adenopathy, no asymmetry, masses, or scars and thyroid normal to palpation     RESP: lungs clear to auscultation - no rales, rhonchi or wheezes     CV: regular rates and rhythm, normal S1 S2, no S3 or S4 and no murmur, click or rub     ABDOMEN:  soft, nontender, no HSM or masses and bowel sounds normal     MS: extremities normal- no gross deformities noted, no evidence of inflammation in joints, FROM in all extremities.     SKIN: no suspicious lesions or rashes     NEURO: Normal strength and tone, sensory exam grossly normal, mentation intact and speech normal     PSYCH: mentation appears normal. and affect normal/bright     LYMPHATICS: No cervical adenopathy    Recent Labs   Lab Test 03/05/21  0000   HGB 14.7        Diagnostics:  Recent Results (from the " past 720 hour(s))   HEMOGRAM PLATELET DIFF (BFP)    Collection Time: 11/02/21  1:46 PM   Result Value Ref Range    WBC 6.4 4.0 - 11 10*9/L    RBC Count 4.48 3.8 - 5.2 10*12/L    Hemoglobin 14.8 11.7 - 15.7 g/dL    Hematocrit 44.6 35.0 - 47.0 %    MCV 99.5 78 - 100 fL    MCH 33.0 26 - 33 pg    MCHC 33.2 31 - 36 g/dL    Platelet Count 325 150 - 375 10^9/L    % Granulocytes 62.7 %    % Lymphocytes 30.1 %    % Monocytes 7.2 %   Basic Metabolic Panel (BFP)    Collection Time: 11/02/21  3:20 PM   Result Value Ref Range    Carbon Dioxide 31.4 20 - 32 mmol/L    Creatinine 0.75 0.60 - 1.30 mg/dL    Glucose 97 60 - 99 mg/dL    Sodium 137.7 135 - 146 mmol/L    Potassium 4.44 3.5 - 5.3 mmol/L    Chloride 100.6 98 - 110 mmol/L    Urea Nitrogen 15 7 - 25 mg/dL    Calcium 10.0 8.6 - 10.3 mg/dL    BUN/Creatinine Ratio 20.0 6 - 22   NM Lexiscan stress test (nuc card)    Collection Time: 11/05/21  3:40 PM   Result Value Ref Range    Target      Baseline Systolic      Baseline Diastolic BP 99     Baseline HR 72 bpm    Stress/rest perfusion ratio 1.03       EKG: sinus rhythm, abnormal, reviewed by me    Revised Cardiac Risk Index (RCRI):  The patient has the following serious cardiovascular risks for perioperative complications:   - No serious cardiac risks = 0 points     RCRI Interpretation: 0 points: Class I (very low risk - 0.4% complication rate)           Signed Electronically by: Anyi Gracia MD  Copy of this evaluation report is provided to requesting physician.       with hospice this afternoon. Will contact office to cancel f/u and testing. Addendum by Dr. Epifania Paget, MD:  Patient seen by me independently including key components of medical care. Face to face evaluation and examination was performed. Case discussed with . RickyKILLIAN Nix - CNP. Agree with Certified nurse practitioner's findings and plan as documented in the Certified nurse practitioner's note. Italicized font, if present,  represents changes to the note made by me. More than 50% of the encounter time involved with patient care by the Pulmonary & Critical care service team spent by me. Please see my modifications mentioned below:  Not in any distress  Denies any worsening of shortness of breath  Received 1 dose of radiation therapy yesterday and waiting for a second dose today. Patient used to go for hospice.       Electronically signed by   Miriam Carlos MD on 12/14/2023 at 6:36 PM

## 2023-12-14 NOTE — DISCHARGE SUMMARY
Hospital Medicine Discharge Summary      Patient Identification:   Wes Muñiz   : 1955  MRN: [id number]   Account: [de-identified]      Patient's PCP: KILLIAN Reich CNP    Admit Date: 2023     Discharge Date:   2023    Admitting Physician: Emilio Saint, MD     Discharging Nurse Practitioner: KILLIAN Jansen CNP     Discharge Diagnoses with Assessment/Plan:  Acute hypoxic respiratory failure, likely secondary to postobstructive pneumonia, pleural effusion and stage IV lung cancer--on 2 L oxygen satting 97%; had Zithromax 500 mg daily from  through 12/10, had 3 doses of Rocephin; Zosyn from -; home oxygen evaluation completed and needs 2 L of oxygen at rest and 10 L with activity, long discussion with patient's wife and patient and they are opting for hospice at this time, hospice is set up oxygen  Left-sided pleural effusion status post thoracentesis on 2023 with 700 mL drained--appreciate pulmonology input, exudative per lights criteria, possibly malignant versus parapneumonic  Malignant neoplasm of left lung with possible metastasis to pancreatic head, lytic lesion left lateral fourth rib--appreciate oncology input, appreciate radiation oncology input; underwent targeting by radiation oncology on ; palliative radiation , , I discussed with [facility] from oncology and OK for discharge and has follow-up appointment on  for chemotherapy; patient is opting for hospice with comfort care  Acute blood loss anemia--discussed with oncology/hematology and plan to transfuse if hemoglobin less than 7, patient and spouse consented to blood transfusion if needed on ; hemoglobin has been fluctuating between 7.1-7.4  Leukocytosis--white count has increased, is afebrile; likely secondary to #3  Thrombocytosis--platelets have improved  Elevated CO2--mild, patient needs to push oral fluids  Hypercalcemia--likely secondary to #3, monitor  Severe malnutrition--per dietitian; was started on Marinol  Primary hypertension, uncontrolled--on Toprol  Possible COPD--will need outpatient PFTs and repeat imaging in approximately 6 to 8 weeks per pulmonology note, currently requiring oxygen; on Stiolto; I notified pulmonology, Charan Sizer that patient is opting for hospice  Anemia of chronic disease--please see #4  Hyperlipidemia--on statin  Remote tobacco abuse history  Physical deconditioning/debility--likely secondary to metastatic cancer  CODE STATUS--DNR comfort care     The patient was seen and examined on day of discharge and this discharge summary is in conjunction with any daily progress note from day of discharge. Hospital Course:   Rosalia Delarosa is a 77 y.o. male admitted to [facility] on 12/8/2023 for shortness of breath;  Per H&P done 12/8/2023: Rosalia Delarosa is a 76 yo M w/significant PMHx of HTN, HLD, Chronic Anemia (ACD), Former tobacco smoker (32PY Hx, quit 2004), COPD (Emphysema - noted on imaging, formal PFT not completed yet), and recently Dx Metastatic Lung AdenoCA who [facility] for evaluation of worsening SOB / Hypoxia / Tachycardia s/p Mediport placement. required 2LNC during procedure -> Post-pocedure, pt went to recovery, noted to have SpO2 86-91% w/Deep breaths -> could not keep >87% on RA so Oncology office was called, stated this was new and fo pt to go to ED for evaluation. On Arrival, Afebrile, R 17 w/SpO2 88% on RA -> RR 26-36 on 2LNC w/SpO2 >93%, -113, BP stable. Unremarkable BMP, HsT 35, Albumin 2.2, . , WBC 16.6 (down from 18.7), H/H 8.2/28.7 (previousl 8.7/30 on 12/6), Plts 469 (12/6 - 581). COVID / Flu (-). CTA Chest demonstrates the LMPa is encased by progression of the left hilar LAD and left perihilar/ROBERTO consolidation has markedly worsened since the prior study.  All of the mediastinal, hilar, and axillary lymph nodes have increased in size + More enlarged lymph nodes [address] [place] [id number]  [phone]    Follow up on 12/19/2023  Follow up appointment December 19, 2023 at [address]  [phone]             Discharge Medications:        Medication List        START taking these medications      dronabinol 2.5 MG capsule  Commonly known as: MARINOL  Take 1 capsule by mouth 2 times daily for 30 days. Max Daily Amount: 5 mg     gabapentin 100 MG capsule  Commonly known as: NEURONTIN  Take 2 capsules by mouth 3 times daily for 30 days. oxyCODONE 5 MG immediate release tablet  Commonly known as: ROXICODONE  Take 1 tablet by mouth every 6 hours as needed for Pain for up to 20 doses.  Max Daily Amount: 20 mg     polyethylene glycol 17 g packet  Commonly known as: GLYCOLAX  Take 1 packet by mouth 2 times daily     senna 8.6 MG tablet  Commonly known as: SENOKOT  Take 2 tablets by mouth nightly            CONTINUE taking these medications      albuterol sulfate  (90 Base) MCG/ACT inhaler  Commonly known as: Ventolin HFA  Inhale 2 puffs into the lungs 4 times daily as needed for Wheezing     Anoro Ellipta 62.5-25 MCG/ACT inhaler  Generic drug: umeclidinium-vilanterol  Inhale 1 puff into the lungs daily     atorvastatin 10 MG tablet  Commonly known as: LIPITOR     benzonatate 100 MG capsule  Commonly known as: TESSALON  Take 1 capsule by mouth 3 times daily as needed for Cough     folic acid 1 MG tablet  Commonly known as: FOLVITE  Take 1 tablet by mouth daily     Handicap Placard Misc  by Does not apply route Good from 12/1/2023 to 12/1/2028     metoprolol succinate 25 MG extended release tablet  Commonly known as: TOPROL XL     mirtazapine 7.5 MG tablet  Commonly known as: REMERON  Take 1 tablet by mouth nightly     ondansetron 8 MG Tbdp disintegrating tablet  Commonly known as: ZOFRAN-ODT  Place 1 tablet under the tongue every 8 hours as needed for Nausea or Vomiting     prochlorperazine 10 MG tablet  Commonly known as: COMPAZINE

## 2023-12-14 NOTE — PLAN OF CARE
SW Consulted.   Problem: Discharge Planning  Goal: Discharge to home or other facility with appropriate resources  12/14/2023 0944 by Leanora Sever, LSW  Outcome: Progressing

## 2023-12-14 NOTE — CARE COORDINATION
12/14/23, 9:33 AM EST    DISCHARGE PLANNING EVALUATION    Spoke with patient. Patient would like [facility]. Would like RN, PT, OT and aide if possible. Spoke with [facility] APRN-CNP. Will get orders placed. Would like phone number to be changed to [phone] Talia Quinonez, Patient's wife #) and address confirmed as [address] with Nancy at [facility] and informed them of discharge today. They will see patient on Saturday. SW faxed referral packet. ARACELY spoke with Vandana Maria from [facility] and set [employer] transport for 3:30pm. Spoke to [employer] and updated her.

## 2023-12-14 NOTE — PROGRESS NOTES
Patient discharged home with wife via transportation service. Educated on discharge instructions, follow ups and medications. Hospice contacted that patient was leaving facility. Contacted wife to inform her of his discharge. No questions or concerns voiced.

## 2023-12-14 NOTE — PLAN OF CARE
Problem: Discharge Planning  Goal: Discharge to home or other facility with appropriate resources  12/13/2023 2211 by Lisa Gupta RN  Outcome: Progressing  12/13/2023 2208 by Lisa Gupta RN  Outcome: Progressing  Flowsheets (Taken 12/13/2023 1915)  Discharge to home or other facility with appropriate resources: Identify barriers to discharge with patient and caregiver     Problem: Respiratory - Adult  Goal: Achieves optimal ventilation and oxygenation  12/13/2023 2211 by Lisa Gupta RN  Outcome: Progressing  12/13/2023 2208 by Lisa Gupta RN  Outcome: Progressing  Flowsheets (Taken 12/13/2023 1915)  Achieves optimal ventilation and oxygenation: Assess for changes in respiratory status     Problem: Cardiovascular - Adult  Goal: Maintains optimal cardiac output and hemodynamic stability  12/13/2023 2211 by Lisa Gupta RN  Outcome: Progressing  12/13/2023 2208 by Lisa Gupta RN  Outcome: Progressing  Flowsheets (Taken 12/13/2023 1915)  Maintains optimal cardiac output and hemodynamic stability: Monitor blood pressure and heart rate  Goal: Absence of cardiac dysrhythmias or at baseline  12/13/2023 2211 by Lisa Gupat RN  Outcome: Progressing  12/13/2023 2208 by Lisa Gupta RN  Outcome: Progressing  Flowsheets (Taken 12/13/2023 1915)  Absence of cardiac dysrhythmias or at baseline: Monitor cardiac rate and rhythm     Problem: Safety - Adult  Goal: Free from fall injury  12/13/2023 2211 by Lisa Gupta RN  Outcome: Progressing  12/13/2023 2208 by Lisa Gupta RN  Outcome: Progressing     Problem: Skin/Tissue Integrity  Goal: Absence of new skin breakdown  Description: 1. Monitor for areas of redness and/or skin breakdown  2. Assess vascular access sites hourly  3. Every 4-6 hours minimum:  Change oxygen saturation probe site  4.   Every 4-6 hours:  If on nasal continuous positive airway pressure, respiratory therapy assess nares and determine need for appliance change or resting period.   12/13/2023 2211 by Emily Florian RN  Outcome: Progressing  12/13/2023 2208 by Emily Florian RN  Outcome: Progressing     Problem: Nutrition Deficit:  Goal: Optimize nutritional status  12/13/2023 2211 by Emily Florian RN  Outcome: Progressing  12/13/2023 2208 by Emily Florian RN  Outcome: Progressing     Problem: Pain  Goal: Verbalizes/displays adequate comfort level or baseline comfort level  12/13/2023 2211 by Emily Florian RN  Outcome: Progressing  12/13/2023 2208 by Emily Florian RN  Outcome: Progressing

## 2023-12-14 NOTE — PALLIATIVE CARE
Follow Up / Progress Note        Patient:   Abhinav Leong  YOB: 1955  Age:  77 y.o. Room:  [facility]03/003-A  MRN:  [id number]         Family/Patient Discussion:  Patient/wife are excited that the patient will be discharged today. Deny any needs at this time.              Electronically signed by Noam Ignacio RN on 12/14/2023 at 10:16 AM             Palliative Care Office: [phone]

## 2023-12-14 NOTE — PROGRESS NOTES
Advanced Directives Consult: Pt was in bed as his wife was with him. He wanted to think more about it before anything is done. He was dealing with acute respiratory failure with hypoxia. Prayer was appreciated.     12/14/23 1049   Encounter Summary   Encounter Overview/Reason  Advance Care Planning   Service Provided For: Patient and family together   Referral/Consult From: Nurse   Support System Spouse   Last Encounter  12/14/23   Complexity of Encounter Low   Begin Time 0910   End Time  0917   Total Time Calculated 7 min   Spiritual/Emotional needs   Type Spiritual Support   Advance Care Planning   Type ACP conversation   Assessment/Intervention/Outcome   Assessment Calm   Intervention Empowerment   Outcome Encouraged

## 2023-12-14 NOTE — PROGRESS NOTES
Physician Progress Note      Gregg Hensley  CSN #:                  [id number]  :                       1955  ADMIT DATE:       2023 10:34 AM  DISCH DATE:        2023 3:52 PM  RESPONDING  PROVIDER #:        Willem Stevens CNP          QUERY TEXT:    Pt admitted with acute respiratory failure and pneumonia. Pt noted to have WBC   16.7, , RR 36 on admission. If possible, please document in the   progress notes and discharge summary if you are evaluating and /or treating   any of the following: The medical record reflects the following:  Risk Factors: Recently Dx Metastatic-PD Lung Adeno CA w/Rhabdoid features    Clinical Indicators:  > Oncology- Leukocytosis  WBC 19.3. Afebrile. CTA Chest 2023 (-) PE, (+) lymphadenopathy   progression from prior with encasement of left main pulmonary artery, left   perihilar/ROBERTO consolidation; markedly worsened. >  PN- Leukocytosis--white count has increased, is afebrile  > 23 06:55 WBC: 16.7 (H)  23 05:33 WBC: 23.2 (H)  > , RR 36 on admission  > T  Temp oral 101.3    Treatment: receiving Zosyn, IV NS    Page Aleman, RN, BSN, Guilherme Mcneal, [address] [place], [place] [address]jaz [email or web address]  Options provided:  -- Sepsis, present on admission  -- Sepsis, was not present on admission and developed after admission  -- Pneumonia without Sepsis  -- Leukocytosis due to metastatic lung cancer  -- Other - I will add my own diagnosis  -- Disagree - Not applicable / Not valid  -- Disagree - Clinically unable to determine / Unknown  -- Refer to Clinical Documentation Reviewer    PROVIDER RESPONSE TEXT:    This patient has leukocytosis due to metastatic lung cancer.     Query created by: Paeg Aleman on 2023 10:58 AM      Electronically signed by:  Willem Stevens CNP 2023 6:14 PM

## 2023-12-14 NOTE — CARE COORDINATION
CM called and spoke with Nina Posey with [facility] regarding walker order.      Primary RN states she has called respiratory for Home O2 eval.

## 2023-12-14 NOTE — DISCHARGE INSTRUCTIONS
PATIENT DISCHARGE INSTRUCTIONS    Remember that side effects present at the end of your treatments will improve within a few weeks after the last treatment. Eat well balanced meals even though your treatments are finished. This will help speed the healing process. Continue any special diets prescribed to control side effects until these side effects have been resolved. Get plenty of rest.  If you have experienced fatigue and/or weakness, this may continue for several weeks after your last treatment. Continue with your daily activities according to the way you feel. Continue to be gentle with your skin. Follow your present skin care instructions until your follow-up visit. IF YOU DEVELOP ANY CHANGES IN YOUR SKIN IN THE AREA TREATED WITH RADIATION, PLEASE CALL THE RADIATION ONCOLOGY NURSE -[phone]. Protect your skin from any injury and avoid direct sun exposure in the treatment area. The skin in the treated area may always be more sensitive than the rest of your skin. Always use SPF 27 or higher sun block if you will be in the sun and cannot avoid exposure. Please contact your referring physician for a follow-up appointment in addition to your Radiation Oncology appointment. You may receive a survey via text message or e-mail at some point after treatment. We would appreciate your time in filling out this survey and giving us your honest feedback about your experience with us. We strive for excellence and hope that you were \"Very Satisfied\" with your care and our service. Presence of pain:   Medication Taper: No    See Instructions Dated: N/A  Follow up orders:  Will be discussed at Follow-Up

## 2023-12-14 NOTE — PROGRESS NOTES
A home oxygen evaluation has been completed. [x]Patient is an inpatient. It is expected that the patient will be discharged within the next 48 hours. Qualified provider to write order for home prescription if patient qualifies. Social service/care managers will arrange for home oxygen. If patient is active, arrange for Home Medical supplier to assess for Oxygen Conserving Device per pulse oximetry. []Patient is an outpatient. Results will be faxed to the ordering provider. Qualified provider to write order for home prescription if patient qualifies and arranges for home oxygen. Patient was placed on room air for 2 minutes. SpO2 was 87 % on room air at rest. Patients SpO2 was below 89% and qualified for home oxygen. Oxygen was applied at 2 lpm via nasal cannula to maintain a SpO2 between 90-92% while at rest. Actual SpO2 was 94 %. Patient can ambulate for exercise flow rate. Patients was ambulated, SpO2 was 92% on 10 lpm to maintain SpO2 between 90-92% while exercising. If oxygen need is greater than 4 lpm the SpO2 on 4 lpm was 80. Note: For any SpO2 at 58% see policy and procedure for possible qualifications. Patient could barely ambulate 6 feet without needing to sit down for breaks and needing to go back to bed, patient was very wobbly and short of breath.

## 2023-12-14 NOTE — PROGRESS NOTES
[facility]  OCCUPATIONAL THERAPY MISSED TREATMENT NOTE  [facility] 8A  [id number]      Date: 2023  Patient Name: Andrew Sahu        CSN: [id number]   : 1955  (77 y.o.)  Gender: male   Referring Practitioner: Sherrie Runner, MD  Diagnosis: Acute respiratory failure with hypoxia         REASON FOR MISSED TREATMENT:  Pt was eating breakfast when arrived. RN stated he is going home today and is going on hospice. Will attempt again if pt remains here.

## 2023-12-15 LAB
BACTERIA SPEC ANAEROBE CULT: NORMAL
BACTERIA SPEC BFLD CULT: NORMAL
GRAM STN SPEC: NORMAL

## 2023-12-15 PROCEDURE — 77336 RADIATION PHYSICS CONSULT: CPT | Performed by: RADIOLOGY

## 2023-12-15 NOTE — PROGRESS NOTES
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 PPS is  50% with performance status reduced due to increased fatigue and weakness, HAMIDA and decreased appetite. Change in wt to current 142# brom baseline wt 195# in 2020. Comorbid Conditions include pleural effusion, blood loss anemia on chronic anemia, centrilobular emphysema  Remote tobacco use of 32 years at 1ppd, quit 8725    [facility]    I certify this pt has a life expectancy of 6 months or less if the disease follows it's normal expected course.     Fanny Ball MD  Board Certified Hospice and [address] [place] [address] Director Certified

## 2023-12-21 ENCOUNTER — HOSPITAL ENCOUNTER (OUTPATIENT)
Dept: INFUSION THERAPY | Age: 68
End: 2023-12-21

## (undated) DEVICE — Device